# Patient Record
Sex: MALE | Race: BLACK OR AFRICAN AMERICAN | NOT HISPANIC OR LATINO
[De-identification: names, ages, dates, MRNs, and addresses within clinical notes are randomized per-mention and may not be internally consistent; named-entity substitution may affect disease eponyms.]

---

## 2018-06-12 ENCOUNTER — APPOINTMENT (OUTPATIENT)
Dept: INTERNAL MEDICINE | Facility: CLINIC | Age: 48
End: 2018-06-12
Payer: COMMERCIAL

## 2018-06-12 VITALS
TEMPERATURE: 98.7 F | WEIGHT: 208 LBS | HEART RATE: 80 BPM | OXYGEN SATURATION: 98 % | BODY MASS INDEX: 29.12 KG/M2 | DIASTOLIC BLOOD PRESSURE: 72 MMHG | HEIGHT: 71 IN | SYSTOLIC BLOOD PRESSURE: 120 MMHG

## 2018-06-12 PROCEDURE — 99213 OFFICE O/P EST LOW 20 MIN: CPT

## 2018-06-12 NOTE — HISTORY OF PRESENT ILLNESS
[FreeTextEntry8] : \par \par CC;tick bite\par \par patient is a 48-year-old male with history of prediabetes, overweight and seasonal allergies who traveling to [facility] when noted a tick bite the following day denies rash, fever, chills, joint pain. she also complains of nail discoloration on big toe

## 2018-06-12 NOTE — PHYSICAL EXAM
[No Acute Distress] : no acute distress [Well Nourished] : well nourished [Well Developed] : well developed [Well-Appearing] : well-appearing [No JVD] : no jugular venous distention [No Lymphadenopathy] : no lymphadenopathy [No Rash] : no rash [No Skin Lesions] : no skin lesions

## 2018-06-12 NOTE — ASSESSMENT
[FreeTextEntry1] : patient is 48-year-old man with history of overweight, prediabetes and seasonal allergies who presents with a tick bite no fever rash one week ago\par \par #1 tick bite\par will do  tic analysis\par No symptoms observe\par \par 2 CPE\par In one month\par

## 2018-06-25 ENCOUNTER — APPOINTMENT (OUTPATIENT)
Dept: INTERNAL MEDICINE | Facility: CLINIC | Age: 48
End: 2018-06-25
Payer: COMMERCIAL

## 2018-06-25 VITALS
OXYGEN SATURATION: 98 % | SYSTOLIC BLOOD PRESSURE: 110 MMHG | BODY MASS INDEX: 29.26 KG/M2 | HEIGHT: 71 IN | DIASTOLIC BLOOD PRESSURE: 64 MMHG | TEMPERATURE: 98.1 F | HEART RATE: 96 BPM | WEIGHT: 209 LBS

## 2018-06-25 DIAGNOSIS — J30.2 OTHER SEASONAL ALLERGIC RHINITIS: ICD-10-CM

## 2018-06-25 DIAGNOSIS — H40.9 UNSPECIFIED GLAUCOMA: ICD-10-CM

## 2018-06-25 LAB
BILIRUB UR QL STRIP: NEGATIVE
CLARITY UR: CLEAR
GLUCOSE UR-MCNC: NEGATIVE
HCG UR QL: 0.2 EU/DL
HGB UR QL STRIP.AUTO: NEGATIVE
INSECT SPEC: ABNORMAL
KETONES UR-MCNC: NEGATIVE
LEUKOCYTE ESTERASE UR QL STRIP: NEGATIVE
NITRITE UR QL STRIP: NEGATIVE
PH UR STRIP: 5.5
PROT UR STRIP-MCNC: NEGATIVE
SP GR UR STRIP: 1.02

## 2018-06-25 PROCEDURE — 36415 COLL VENOUS BLD VENIPUNCTURE: CPT

## 2018-06-25 PROCEDURE — 81003 URINALYSIS AUTO W/O SCOPE: CPT | Mod: QW

## 2018-06-25 PROCEDURE — 99396 PREV VISIT EST AGE 40-64: CPT | Mod: 25

## 2018-06-25 NOTE — PHYSICAL EXAM
[No Acute Distress] : no acute distress [Well Nourished] : well nourished [Well Developed] : well developed [Well-Appearing] : well-appearing [Normal Voice/Communication] : normal voice/communication [Normal Sclera/Conjunctiva] : normal sclera/conjunctiva [PERRL] : pupils equal round and reactive to light [Normal Outer Ear/Nose] : the outer ears and nose were normal in appearance [Normal Oropharynx] : the oropharynx was normal [No Lymphadenopathy] : no lymphadenopathy [Thyroid Normal, No Nodules] : the thyroid was normal and there were no nodules present [No Respiratory Distress] : no respiratory distress  [Clear to Auscultation] : lungs were clear to auscultation bilaterally [No Accessory Muscle Use] : no accessory muscle use [Normal Percussion] : the chest was normal to percussion [Normal Rate] : normal rate  [Regular Rhythm] : with a regular rhythm [Normal S1, S2] : normal S1 and S2 [No Murmur] : no murmur heard [No Carotid Bruits] : no carotid bruits [No Abdominal Bruit] : a ~M bruit was not heard ~T in the abdomen [Pedal Pulses Present] : the pedal pulses are present [No Edema] : there was no peripheral edema [No Extremity Clubbing/Cyanosis] : no extremity clubbing/cyanosis [No Palpable Aorta] : no palpable aorta [Normal Appearance] : normal in appearance [No Nipple Discharge] : no nipple discharge [No Axillary Lymphadenopathy] : no axillary lymphadenopathy [Soft] : abdomen soft [Non Tender] : non-tender [Non-distended] : non-distended [No Masses] : no abdominal mass palpated [No HSM] : no HSM [Normal Bowel Sounds] : normal bowel sounds [No Hernias] : no hernias [No Stool to Guaiac] : no stool to guaiac [Normal Sphincter Tone] : normal sphincter tone [No Mass] : no mass [Penis Abnormality] : normal circumcised penis [Scrotum] : the scrotum was normal [Testes Tenderness] : no tenderness of the testes [Testes Mass (___cm)] : there were no testicular masses [Anus Abnormality] : the anus and perineum were normal [Prostate Tenderness] : the prostate was not tender [Prostate Enlarged] : was enlarged [Normal Supraclavicular Nodes] : no supraclavicular lymphadenopathy [Normal Axillary Nodes] : no axillary lymphadenopathy [Normal Posterior Cervical Nodes] : no posterior cervical lymphadenopathy [Normal Femoral Nodes] : no femoral lymphadenopathy [Normal Anterior Cervical Nodes] : no anterior cervical lymphadenopathy [Normal Inguinal Nodes] : no inguinal lymphadenopathy [No CVA Tenderness] : no CVA  tenderness [No Spinal Tenderness] : no spinal tenderness [No Joint Swelling] : no joint swelling [Grossly Normal Strength/Tone] : grossly normal strength/tone [No Rash] : no rash [Normal Gait] : normal gait [Coordination Grossly Intact] : coordination grossly intact [No Focal Deficits] : no focal deficits [Speech Grossly Normal] : speech grossly normal [Memory Grossly Normal] : memory grossly normal [Normal Affect] : the affect was normal [Alert and Oriented x3] : oriented to person, place, and time [Normal Mood] : the mood was normal [Normal Insight/Judgement] : insight and judgment were intact [Stool Occult Blood] : no occult stool [Prostate Nodule] : did not have a nodule [Prostate Tenderness] : was not tender [Prostate Fluctuant] : was not fluctuant [de-identified] : Bilateral cerumen [de-identified] : left leg varicosity mild [de-identified] : Diastasis recti

## 2018-06-25 NOTE — HISTORY OF PRESENT ILLNESS
[FreeTextEntry1] : Comprehensive annual physical examination [de-identified] : \par \par Patient is a healthy 48-year-old  male with history of glaucoma, seasonal allergies, prediabetes, BPH and vitamin D insufficiency who presents for comprehensive annual physical examination patient had a tick bite several weeks ago identified as [place] patient denies rash, fever, chills, chest pain, palpitation, lightheadedness, dizziness.

## 2018-06-25 NOTE — ASSESSMENT
[FreeTextEntry1] : Patient is a 48-year-old male with history of overweight, prediabetes, allergic rhinitis, vitamin D insufficiency who presents for complaints of annual physical examination\par \par #1 overweight\par Counseled on exercise\par \par 2 prediabetes\par Check hemoglobin A1c\par Counseled on diet\par \par #3 vitamin D deficiency\par Counseled on vitamin D 400 units a day\par \par #4 BPH\par Enlarged prostate will check PSA\par \par 5 history of tick bite\par [place] tick usually doesn't carry Lyme no rash no other symptoms no flulike syndrome.\par \par #6 health maintenance\par Counseled on sunscreen protection\par Check routine labs\par Followup in 6-12 months\par \par #7, history of kidney cancer\par Check point-of-care UA

## 2018-06-25 NOTE — HEALTH RISK ASSESSMENT
[Excellent] : ~his/her~  mood as  excellent [No falls in past year] : Patient reported no falls in the past year [0] : 2) Feeling down, depressed, or hopeless: Not at all (0) [None] : None [With Family] : lives with family [Employed] : employed [College] : College [] :  [Sexually Active] : sexually active [Feels Safe at Home] : Feels safe at home [Fully functional (bathing, dressing, toileting, transferring, walking, feeding)] : Fully functional (bathing, dressing, toileting, transferring, walking, feeding) [Fully functional (using the telephone, shopping, preparing meals, housekeeping, doing laundry, using] : Fully functional and needs no help or supervision to perform IADLs (using the telephone, shopping, preparing meals, housekeeping, doing laundry, using transportation, managing medications and managing finances) [] : No [de-identified] : social [Change in mental status noted] : No change in mental status noted [Language] : denies difficulty with language [Learning/Retaining New Information] : denies difficulty learning/retaining new information [Handling Complex Tasks] : denies difficulty handling complex tasks [Reasoning] : denies difficulty with reasoning [Spatial Ability and Orientation] : denies difficulty with spatial ability and orientation [High Risk Behavior] : no high risk behavior

## 2018-07-19 LAB
25(OH)D3 SERPL-MCNC: 22.3 NG/ML
ALBUMIN SERPL ELPH-MCNC: 4.4 G/DL
ALP BLD-CCNC: 79 U/L
ALT SERPL-CCNC: 24 U/L
ANION GAP SERPL CALC-SCNC: 11 MMOL/L
AST SERPL-CCNC: 28 U/L
BASOPHILS # BLD AUTO: 0.04 K/UL
BASOPHILS NFR BLD AUTO: 0.7 %
BILIRUB SERPL-MCNC: 0.2 MG/DL
BUN SERPL-MCNC: 18 MG/DL
CALCIUM SERPL-MCNC: 9.2 MG/DL
CHLORIDE SERPL-SCNC: 106 MMOL/L
CHOLEST SERPL-MCNC: 195 MG/DL
CHOLEST/HDLC SERPL: 3.5 RATIO
CO2 SERPL-SCNC: 24 MMOL/L
CREAT SERPL-MCNC: 1.13 MG/DL
EOSINOPHIL # BLD AUTO: 0.31 K/UL
EOSINOPHIL NFR BLD AUTO: 5.3 %
GLUCOSE SERPL-MCNC: 113 MG/DL
HBA1C MFR BLD HPLC: 6.2 %
HCT VFR BLD CALC: 44.6 %
HDLC SERPL-MCNC: 56 MG/DL
HGB BLD-MCNC: 13.2 G/DL
IMM GRANULOCYTES NFR BLD AUTO: 0 %
LDLC SERPL CALC-MCNC: 128 MG/DL
LYMPHOCYTES # BLD AUTO: 1.81 K/UL
LYMPHOCYTES NFR BLD AUTO: 31 %
MAN DIFF?: NORMAL
MCHC RBC-ENTMCNC: 26.2 PG
MCHC RBC-ENTMCNC: 29.6 GM/DL
MCV RBC AUTO: 88.5 FL
MONOCYTES # BLD AUTO: 0.36 K/UL
MONOCYTES NFR BLD AUTO: 6.2 %
NEUTROPHILS # BLD AUTO: 3.31 K/UL
NEUTROPHILS NFR BLD AUTO: 56.8 %
PLATELET # BLD AUTO: 285 K/UL
POTASSIUM SERPL-SCNC: 4.3 MMOL/L
PROT SERPL-MCNC: 7.6 G/DL
PSA SERPL-MCNC: 1.06 NG/ML
RBC # BLD: 5.04 M/UL
RBC # FLD: 14 %
SODIUM SERPL-SCNC: 141 MMOL/L
TRIGL SERPL-MCNC: 56 MG/DL
WBC # FLD AUTO: 5.83 K/UL

## 2020-01-02 ENCOUNTER — APPOINTMENT (OUTPATIENT)
Dept: INTERNAL MEDICINE | Facility: CLINIC | Age: 50
End: 2020-01-02

## 2020-07-23 LAB
SARS-COV-2 IGG SERPL IA-ACNC: <0.1 INDEX
SARS-COV-2 IGG SERPL QL IA: NEGATIVE

## 2020-10-01 ENCOUNTER — APPOINTMENT (OUTPATIENT)
Dept: INTERNAL MEDICINE | Facility: CLINIC | Age: 50
End: 2020-10-01
Payer: COMMERCIAL

## 2020-10-01 VITALS
TEMPERATURE: 97.8 F | OXYGEN SATURATION: 98 % | BODY MASS INDEX: 30.06 KG/M2 | DIASTOLIC BLOOD PRESSURE: 81 MMHG | WEIGHT: 210 LBS | HEART RATE: 72 BPM | SYSTOLIC BLOOD PRESSURE: 131 MMHG | HEIGHT: 70.25 IN

## 2020-10-01 VITALS — SYSTOLIC BLOOD PRESSURE: 122 MMHG | DIASTOLIC BLOOD PRESSURE: 82 MMHG

## 2020-10-01 DIAGNOSIS — Z23 ENCOUNTER FOR IMMUNIZATION: ICD-10-CM

## 2020-10-01 DIAGNOSIS — Z00.00 ENCOUNTER FOR GENERAL ADULT MEDICAL EXAMINATION W/OUT ABNORMAL FINDINGS: ICD-10-CM
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
## 2020-10-01 PROCEDURE — 36415 COLL VENOUS BLD VENIPUNCTURE: CPT

## 2020-10-01 PROCEDURE — 99396 PREV VISIT EST AGE 40-64: CPT | Mod: 25

## 2020-10-01 PROCEDURE — 90686 IIV4 VACC NO PRSV 0.5 ML IM: CPT

## 2020-10-01 PROCEDURE — G0008: CPT

## 2020-10-01 NOTE — HISTORY OF PRESENT ILLNESS
[FreeTextEntry1] : Comprehensive annual physical examination [de-identified] : The patient is a 50-year-old male with history of glaucoma, BPH, overweight, prediabetes and seasonal allergies who presents for comprehensive annual physical examination. Patient feels well denies chest pain, shortness of breath distant exertion palpitation dysuria frequency of urination denies flank pain, nausea vomiting, constipation. Feels well complains of some left sided neck pain

## 2020-10-01 NOTE — ASSESSMENT
[FreeTextEntry1] : Patient is a 50-year-old male with history of overweight, prediabetes, glaucoma, BPH, who presents for comprehensive annual physical examination and complains of neck pain\par \par 1. Neck pain\par most likely musculoskeletal stretching Advil as needed\par observe uses computer lot\par \par 2. Overweight\par counseled on diet and exercise goal weight under 200\par \par 3.. Prediabetes\par  on diet\par hemoglobin A1c\par \par 4. BPH\par check PSA\par minimal symptomatic\par \par 5. Health maintenance\par influenza vaccine today\par referral for colonoscopy\par check routine labs\par follow-up in six months\par \par 6. Vitamin D insufficiency\par counseled on vitamin D usage check level

## 2020-10-01 NOTE — PHYSICAL EXAM
[Normal Sclera/Conjunctiva] : normal sclera/conjunctiva [Normal Outer Ear/Nose] : the outer ears and nose were normal in appearance [No Masses] : no palpable masses [No Nipple Discharge] : no nipple discharge [No Axillary Lymphadenopathy] : no axillary lymphadenopathy [Normal Sphincter Tone] : normal sphincter tone [No Mass] : no mass [Normal] : affect was normal and insight and judgment were intact [Stool Occult Blood] : stool negative for occult blood [de-identified] : Die stasis rec. [de-identified] : Neck no and lymph nodes no tenderness

## 2020-10-01 NOTE — HEALTH RISK ASSESSMENT
[Very Good] : ~his/her~  mood as very good [Yes] : Yes [2 - 4 times a month (2 pts)] : 2-4 times a month (2 points) [1 or 2 (0 pts)] : 1 or 2 (0 points) [Never (0 pts)] : Never (0 points) [No falls in past year] : Patient reported no falls in the past year [0] : 2) Feeling down, depressed, or hopeless: Not at all (0) [HIV test declined] : HIV test declined [Hepatitis C test declined] : Hepatitis C test declined [None] : None [With Family] : lives with family [# of Members in Household ___] :  household currently consist of [unfilled] member(s) [Employed] : employed [College] : College [] :  [Feels Safe at Home] : Feels safe at home [Fully functional (bathing, dressing, toileting, transferring, walking, feeding)] : Fully functional (bathing, dressing, toileting, transferring, walking, feeding) [Fully functional (using the telephone, shopping, preparing meals, housekeeping, doing laundry, using] : Fully functional and needs no help or supervision to perform IADLs (using the telephone, shopping, preparing meals, housekeeping, doing laundry, using transportation, managing medications and managing finances) [Reports normal functional visual acuity (ie: able to read med bottle)] : Reports normal functional visual acuity [Smoke Detector] : smoke detector [Carbon Monoxide Detector] : carbon monoxide detector [Safety elements used in home] : safety elements used in home [Seat Belt] :  uses seat belt [Sunscreen] : uses sunscreen [Audit-CScore] : 2 [de-identified] : Minimal walking [de-identified] : Fairly healthy but widespread [[id number]] : 0 [Change in mental status noted] : No change in mental status noted [Language] : denies difficulty with language [Behavior] : denies difficulty with behavior [Handling Complex Tasks] : denies difficulty handling complex tasks [Reasoning] : denies difficulty with reasoning [Spatial Ability and Orientation] : denies difficulty with spatial ability and orientation [Reports changes in hearing] : Reports no changes in hearing [Reports changes in vision] : Reports no changes in vision [Reports changes in dental health] : Reports no changes in dental health [Guns at Home] : no guns at home [Travel to Developing Areas] : does not  travel to developing areas [TB Exposure] : is not being exposed to tuberculosis [Caregiver Concerns] : does not have caregiver concerns

## 2020-10-07 ENCOUNTER — TRANSCRIPTION ENCOUNTER (OUTPATIENT)
Age: 50
End: 2020-10-07

## 2020-10-09 LAB
25(OH)D3 SERPL-MCNC: 24.3 NG/ML
ALBUMIN SERPL ELPH-MCNC: 4.6 G/DL
ALP BLD-CCNC: 86 U/L
ALT SERPL-CCNC: 19 U/L
ANION GAP SERPL CALC-SCNC: 11 MMOL/L
AST SERPL-CCNC: 19 U/L
BASOPHILS # BLD AUTO: 0.07 K/UL
BASOPHILS NFR BLD AUTO: 0.9 %
BILIRUB SERPL-MCNC: 0.4 MG/DL
BUN SERPL-MCNC: 15 MG/DL
CALCIUM SERPL-MCNC: 9.3 MG/DL
CHLORIDE SERPL-SCNC: 104 MMOL/L
CHOLEST SERPL-MCNC: 225 MG/DL
CHOLEST/HDLC SERPL: 4.3 RATIO
CO2 SERPL-SCNC: 25 MMOL/L
CREAT SERPL-MCNC: 1.1 MG/DL
EOSINOPHIL # BLD AUTO: 0.34 K/UL
EOSINOPHIL NFR BLD AUTO: 4.6 %
ESTIMATED AVERAGE GLUCOSE: 131 MG/DL
GLUCOSE SERPL-MCNC: 83 MG/DL
HBA1C MFR BLD HPLC: 6.2 %
HCT VFR BLD CALC: 43.5 %
HDLC SERPL-MCNC: 53 MG/DL
HGB BLD-MCNC: 13 G/DL
IMM GRANULOCYTES NFR BLD AUTO: 0.3 %
LDLC SERPL CALC-MCNC: 160 MG/DL
LYMPHOCYTES # BLD AUTO: 2.37 K/UL
LYMPHOCYTES NFR BLD AUTO: 31.7 %
MAN DIFF?: NORMAL
MCHC RBC-ENTMCNC: 27.1 PG
MCHC RBC-ENTMCNC: 29.9 GM/DL
MCV RBC AUTO: 90.8 FL
MONOCYTES # BLD AUTO: 0.71 K/UL
MONOCYTES NFR BLD AUTO: 9.5 %
NEUTROPHILS # BLD AUTO: 3.96 K/UL
NEUTROPHILS NFR BLD AUTO: 53 %
PLATELET # BLD AUTO: 288 K/UL
POTASSIUM SERPL-SCNC: 4.3 MMOL/L
PROT SERPL-MCNC: 7.2 G/DL
RBC # BLD: 4.79 M/UL
RBC # FLD: 13.5 %
SODIUM SERPL-SCNC: 140 MMOL/L
TRIGL SERPL-MCNC: 63 MG/DL
WBC # FLD AUTO: 7.47 K/UL

## 2021-01-26 ENCOUNTER — NON-APPOINTMENT (OUTPATIENT)
Age: 51
End: 2021-01-26

## 2021-01-26 ENCOUNTER — APPOINTMENT (OUTPATIENT)
Dept: INTERNAL MEDICINE | Facility: CLINIC | Age: 51
End: 2021-01-26
Payer: COMMERCIAL

## 2021-01-26 VITALS — RESPIRATION RATE: 15 BRPM | OXYGEN SATURATION: 96 % | TEMPERATURE: 98.4 F

## 2021-01-26 PROCEDURE — 99072 ADDL SUPL MATRL&STAF TM PHE: CPT

## 2021-01-26 PROCEDURE — 99211 OFF/OP EST MAY X REQ PHY/QHP: CPT

## 2021-01-28 ENCOUNTER — TRANSCRIPTION ENCOUNTER (OUTPATIENT)
Age: 51
End: 2021-01-28

## 2021-01-28 LAB — SARS-COV-2 N GENE NPH QL NAA+PROBE: DETECTED

## 2021-01-30 ENCOUNTER — APPOINTMENT (OUTPATIENT)
Dept: DISASTER EMERGENCY | Facility: HOSPITAL | Age: 51
End: 2021-01-30

## 2021-01-31 ENCOUNTER — NON-APPOINTMENT (OUTPATIENT)
Age: 51
End: 2021-01-31

## 2021-01-31 ENCOUNTER — OUTPATIENT (OUTPATIENT)
Dept: INPATIENT UNIT | Facility: HOSPITAL | Age: 51
LOS: 1 days | End: 2021-01-31

## 2021-01-31 ENCOUNTER — APPOINTMENT (OUTPATIENT)
Dept: DISASTER EMERGENCY | Facility: HOSPITAL | Age: 51
End: 2021-01-31

## 2021-01-31 ENCOUNTER — INPATIENT (INPATIENT)
Facility: HOSPITAL | Age: 51
LOS: 0 days | Discharge: ANOTHER HOSPITAL | DRG: 177 | End: 2021-02-01
Attending: INTERNAL MEDICINE | Admitting: INTERNAL MEDICINE
Payer: COMMERCIAL

## 2021-01-31 VITALS
TEMPERATURE: 99 F | RESPIRATION RATE: 20 BRPM | SYSTOLIC BLOOD PRESSURE: 142 MMHG | HEART RATE: 79 BPM | HEIGHT: 70 IN | DIASTOLIC BLOOD PRESSURE: 86 MMHG | WEIGHT: 205.03 LBS | OXYGEN SATURATION: 91 %

## 2021-01-31 VITALS
OXYGEN SATURATION: 93 % | SYSTOLIC BLOOD PRESSURE: 101 MMHG | DIASTOLIC BLOOD PRESSURE: 63 MMHG | HEIGHT: 70 IN | WEIGHT: 205.03 LBS | RESPIRATION RATE: 20 BRPM | TEMPERATURE: 99 F | HEART RATE: 76 BPM

## 2021-01-31 DIAGNOSIS — R14.0 ABDOMINAL DISTENSION (GASEOUS): ICD-10-CM

## 2021-01-31 DIAGNOSIS — U07.1 COVID-19: ICD-10-CM

## 2021-01-31 LAB
ALBUMIN SERPL ELPH-MCNC: 3.7 G/DL — SIGNIFICANT CHANGE UP (ref 3.3–5)
ALP SERPL-CCNC: 64 U/L — SIGNIFICANT CHANGE UP (ref 40–120)
ALT FLD-CCNC: 40 U/L — SIGNIFICANT CHANGE UP (ref 10–45)
ANION GAP SERPL CALC-SCNC: 15 MMOL/L — SIGNIFICANT CHANGE UP (ref 5–17)
AST SERPL-CCNC: 70 U/L — HIGH (ref 10–40)
BASOPHILS # BLD AUTO: 0.02 K/UL — SIGNIFICANT CHANGE UP (ref 0–0.2)
BASOPHILS NFR BLD AUTO: 0.3 % — SIGNIFICANT CHANGE UP (ref 0–2)
BILIRUB SERPL-MCNC: 0.6 MG/DL — SIGNIFICANT CHANGE UP (ref 0.2–1.2)
BUN SERPL-MCNC: 13 MG/DL — SIGNIFICANT CHANGE UP (ref 7–23)
CALCIUM SERPL-MCNC: 8.8 MG/DL — SIGNIFICANT CHANGE UP (ref 8.4–10.5)
CHLORIDE SERPL-SCNC: 98 MMOL/L — SIGNIFICANT CHANGE UP (ref 96–108)
CO2 SERPL-SCNC: 24 MMOL/L — SIGNIFICANT CHANGE UP (ref 22–31)
CREAT SERPL-MCNC: 1.02 MG/DL — SIGNIFICANT CHANGE UP (ref 0.5–1.3)
CRP SERPL-MCNC: 11.66 MG/DL — HIGH (ref 0–0.4)
D DIMER BLD IA.RAPID-MCNC: 538 NG/ML DDU — HIGH
EOSINOPHIL # BLD AUTO: 0.02 K/UL — SIGNIFICANT CHANGE UP (ref 0–0.5)
EOSINOPHIL NFR BLD AUTO: 0.3 % — SIGNIFICANT CHANGE UP (ref 0–6)
FERRITIN SERPL-MCNC: 314 NG/ML — SIGNIFICANT CHANGE UP (ref 30–400)
GAS PNL BLDV: SIGNIFICANT CHANGE UP
GLUCOSE SERPL-MCNC: 113 MG/DL — HIGH (ref 70–99)
HCT VFR BLD CALC: 42 % — SIGNIFICANT CHANGE UP (ref 39–50)
HGB BLD-MCNC: 13.2 G/DL — SIGNIFICANT CHANGE UP (ref 13–17)
IMM GRANULOCYTES NFR BLD AUTO: 0.6 % — SIGNIFICANT CHANGE UP (ref 0–1.5)
LYMPHOCYTES # BLD AUTO: 1.05 K/UL — SIGNIFICANT CHANGE UP (ref 1–3.3)
LYMPHOCYTES # BLD AUTO: 13.5 % — SIGNIFICANT CHANGE UP (ref 13–44)
MAGNESIUM SERPL-MCNC: 2.5 MG/DL — SIGNIFICANT CHANGE UP (ref 1.6–2.6)
MCHC RBC-ENTMCNC: 26.1 PG — LOW (ref 27–34)
MCHC RBC-ENTMCNC: 31.4 GM/DL — LOW (ref 32–36)
MCV RBC AUTO: 83 FL — SIGNIFICANT CHANGE UP (ref 80–100)
MONOCYTES # BLD AUTO: 0.53 K/UL — SIGNIFICANT CHANGE UP (ref 0–0.9)
MONOCYTES NFR BLD AUTO: 6.8 % — SIGNIFICANT CHANGE UP (ref 2–14)
NEUTROPHILS # BLD AUTO: 6.13 K/UL — SIGNIFICANT CHANGE UP (ref 1.8–7.4)
NEUTROPHILS NFR BLD AUTO: 78.5 % — HIGH (ref 43–77)
NRBC # BLD: 0 /100 WBCS — SIGNIFICANT CHANGE UP (ref 0–0)
PHOSPHATE SERPL-MCNC: 2.2 MG/DL — LOW (ref 2.5–4.5)
PLATELET # BLD AUTO: 250 K/UL — SIGNIFICANT CHANGE UP (ref 150–400)
POTASSIUM SERPL-MCNC: 3.5 MMOL/L — SIGNIFICANT CHANGE UP (ref 3.5–5.3)
POTASSIUM SERPL-SCNC: 3.5 MMOL/L — SIGNIFICANT CHANGE UP (ref 3.5–5.3)
PROCALCITONIN SERPL-MCNC: 0.12 NG/ML — HIGH (ref 0.02–0.1)
PROT SERPL-MCNC: 7.6 G/DL — SIGNIFICANT CHANGE UP (ref 6–8.3)
RBC # BLD: 5.06 M/UL — SIGNIFICANT CHANGE UP (ref 4.2–5.8)
RBC # FLD: 13.1 % — SIGNIFICANT CHANGE UP (ref 10.3–14.5)
SARS-COV-2 RNA SPEC QL NAA+PROBE: DETECTED
SODIUM SERPL-SCNC: 137 MMOL/L — SIGNIFICANT CHANGE UP (ref 135–145)
WBC # BLD: 7.8 K/UL — SIGNIFICANT CHANGE UP (ref 3.8–10.5)
WBC # FLD AUTO: 7.8 K/UL — SIGNIFICANT CHANGE UP (ref 3.8–10.5)

## 2021-01-31 PROCEDURE — 99285 EMERGENCY DEPT VISIT HI MDM: CPT | Mod: 25

## 2021-01-31 PROCEDURE — 82947 ASSAY GLUCOSE BLOOD QUANT: CPT

## 2021-01-31 PROCEDURE — 71045 X-RAY EXAM CHEST 1 VIEW: CPT | Mod: 26

## 2021-01-31 PROCEDURE — 85025 COMPLETE CBC W/AUTO DIFF WBC: CPT

## 2021-01-31 PROCEDURE — 93010 ELECTROCARDIOGRAM REPORT: CPT | Mod: NC

## 2021-01-31 PROCEDURE — 86140 C-REACTIVE PROTEIN: CPT

## 2021-01-31 PROCEDURE — U0003: CPT

## 2021-01-31 PROCEDURE — 83735 ASSAY OF MAGNESIUM: CPT

## 2021-01-31 PROCEDURE — 80053 COMPREHEN METABOLIC PANEL: CPT

## 2021-01-31 PROCEDURE — 82728 ASSAY OF FERRITIN: CPT

## 2021-01-31 PROCEDURE — 84145 PROCALCITONIN (PCT): CPT

## 2021-01-31 PROCEDURE — 84295 ASSAY OF SERUM SODIUM: CPT

## 2021-01-31 PROCEDURE — 82803 BLOOD GASES ANY COMBINATION: CPT

## 2021-01-31 PROCEDURE — 99285 EMERGENCY DEPT VISIT HI MDM: CPT

## 2021-01-31 PROCEDURE — 96374 THER/PROPH/DIAG INJ IV PUSH: CPT

## 2021-01-31 PROCEDURE — 82435 ASSAY OF BLOOD CHLORIDE: CPT

## 2021-01-31 PROCEDURE — 82330 ASSAY OF CALCIUM: CPT

## 2021-01-31 PROCEDURE — 93005 ELECTROCARDIOGRAM TRACING: CPT

## 2021-01-31 PROCEDURE — 71250 CT THORAX DX C-: CPT | Mod: 26

## 2021-01-31 PROCEDURE — 84132 ASSAY OF SERUM POTASSIUM: CPT

## 2021-01-31 PROCEDURE — 71250 CT THORAX DX C-: CPT

## 2021-01-31 PROCEDURE — U0005: CPT

## 2021-01-31 PROCEDURE — 85018 HEMOGLOBIN: CPT

## 2021-01-31 PROCEDURE — 71045 X-RAY EXAM CHEST 1 VIEW: CPT

## 2021-01-31 PROCEDURE — 84100 ASSAY OF PHOSPHORUS: CPT

## 2021-01-31 PROCEDURE — 83605 ASSAY OF LACTIC ACID: CPT

## 2021-01-31 PROCEDURE — 85014 HEMATOCRIT: CPT

## 2021-01-31 PROCEDURE — 85379 FIBRIN DEGRADATION QUANT: CPT

## 2021-01-31 RX ORDER — ACETAMINOPHEN 500 MG
650 TABLET ORAL ONCE
Refills: 0 | Status: COMPLETED | OUTPATIENT
Start: 2021-01-31 | End: 2021-01-31

## 2021-01-31 RX ORDER — SODIUM CHLORIDE 9 MG/ML
1000 INJECTION, SOLUTION INTRAVENOUS ONCE
Refills: 0 | Status: COMPLETED | OUTPATIENT
Start: 2021-01-31 | End: 2021-01-31

## 2021-01-31 RX ORDER — REMDESIVIR 5 MG/ML
200 INJECTION INTRAVENOUS EVERY 24 HOURS
Refills: 0 | Status: DISCONTINUED | OUTPATIENT
Start: 2021-01-31 | End: 2021-02-01

## 2021-01-31 RX ORDER — ENOXAPARIN SODIUM 100 MG/ML
40 INJECTION SUBCUTANEOUS DAILY
Refills: 0 | Status: DISCONTINUED | OUTPATIENT
Start: 2021-01-31 | End: 2021-02-01

## 2021-01-31 RX ORDER — REMDESIVIR 5 MG/ML
INJECTION INTRAVENOUS
Refills: 0 | Status: DISCONTINUED | OUTPATIENT
Start: 2021-01-31 | End: 2021-02-01

## 2021-01-31 RX ORDER — DEXAMETHASONE 0.5 MG/5ML
6 ELIXIR ORAL ONCE
Refills: 0 | Status: COMPLETED | OUTPATIENT
Start: 2021-01-31 | End: 2021-01-31

## 2021-01-31 RX ORDER — DEXAMETHASONE 0.5 MG/5ML
6 ELIXIR ORAL DAILY
Refills: 0 | Status: DISCONTINUED | OUTPATIENT
Start: 2021-01-31 | End: 2021-02-01

## 2021-01-31 RX ADMIN — Medication 6 MILLIGRAM(S): at 13:37

## 2021-01-31 RX ADMIN — SODIUM CHLORIDE 1000 MILLILITER(S): 9 INJECTION, SOLUTION INTRAVENOUS at 13:37

## 2021-01-31 RX ADMIN — Medication 650 MILLIGRAM(S): at 13:37

## 2021-01-31 NOTE — H&P ADULT - ASSESSMENT
49 y/o M w/ no sig PMH presenting w/ shortness of breath. Pt reports 10 days ago developing fevers. A few days later had covid swab and tested positive for COVID. Since then he has been experiencing persistent fevers, cough, shortness of breath, malaise, diarrhea, poor appetite, and dyspnea on exertion. Diarrhea multiple times a day, non bloody. Went to [facility] infusion center today but was told he did not qualify for infusions so came to ED for further evaluation. Last took advil earlier this morning. Denies headache, dizziness, blurred vision, chest pain, abdominal pain, nausea, vomiting, constipation, urinary symptoms, rash. 51 y/o M w/ no sig PMH presenting w/ shortness of breath. Pt reports 10 days ago developing fevers. A few days later had covid swab and tested positive for COVID. Since then he has been experiencing persistent fevers, cough, shortness of breath, malaise, diarrhea, poor appetite, and dyspnea on exertion. Diarrhea multiple times a day, non bloody. Went to [facility] infusion center today but was told he did not qualify for infusions so came to ED for further evaluation. Last took advil earlier this morning. Denies headache, dizziness, blurred vision, chest pain, abdominal pain, nausea, vomiting, constipation, urinary symptoms, rash.    A/P: acute covid illness complicated by covid PNA: start remdesivir, dexamethasone, supplement w O2, duonebs prn, pulm and ID cnsults called. DVT ppx w sc lovenox 40 mg q12H. prn tylenol. supportive treatment for diarrhea/nausea. IVF

## 2021-01-31 NOTE — ED ADULT NURSE REASSESSMENT NOTE - NS ED NURSE REASSESS COMMENT FT1
Pt remains AAOx4, NAD, resp nonlabored on 3 L NC, resting comfortably in bed with call bell at bedside. Pt denies headache, dizziness, chest pain, palpitations, SOB, abd pain, n/v/d, urinary symptoms, fevers, weakness at this time. Pt awaiting transfer to [facility]. Safety maintained.

## 2021-01-31 NOTE — H&P ADULT - NSHPPHYSICALEXAM_GEN_ALL_CORE
T(F): 98 (01-31-21 @ 15:04), Max: 99.3 (01-31-21 @ 12:44)  HR: 80 (01-31-21 @ 15:04) (76 - 80)  BP: 135/78 (01-31-21 @ 15:04) (101/63 - 142/86)  RR: 20 (01-31-21 @ 15:04) (20 - 20)  SpO2: 99% (01-31-21 @ 15:04) (91% - 99%)    PHYSICAL EXAM:  GENERAL: NAD, well-developed  HEAD:  Atraumatic, Normocephalic  EYES: EOMI, PERRLA, conjunctiva and sclera clear  NECK: Supple, No JVD  CHEST/LUNG: Clear to auscultation bilaterally; No wheeze  HEART: Regular rate and rhythm; No murmurs, rubs, or gallops  ABDOMEN: Soft, Nontender, Nondistended; Bowel sounds present  EXTREMITIES:  2+ Peripheral Pulses, No clubbing, cyanosis, or edema  PSYCH: AAOx3  NEUROLOGY: non-focal  SKIN: No rashes or lesions

## 2021-01-31 NOTE — H&P ADULT - HISTORY OF PRESENT ILLNESS
49 y/o M w/ no sig PMH presenting w/ shortness of breath. Pt reports 10 days ago developing fevers. A few days later had covid swab and tested positive for COVID. Since then he has been experiencing persistent fevers, cough, shortness of breath, malaise, diarrhea, poor appetite, and dyspnea on exertion. Diarrhea multiple times a day, non bloody. Went to [facility] infusion center today but was told he did not qualify for infusions so came to ED for further evaluation. Last took advil earlier this morning. Denies headache, dizziness, blurred vision, chest pain, abdominal pain, nausea, vomiting, constipation, urinary symptoms, rash.

## 2021-01-31 NOTE — ED PROVIDER NOTE - CLINICAL SUMMARY MEDICAL DECISION MAKING FREE TEXT BOX
51 y/o M w/ no sig PMH presenting w/ SOB. Pt w/ symptoms consistent of worsening COVID infection. Pt hypoxic on room air and requiring oxygen now. Pt will require admission. Plan for labs, imaging, steroids, tylenol, IVF, supplemental oxygen, EKG, pulse ox monitoring. Will reassess the need for additional interventions as clinically warranted.

## 2021-01-31 NOTE — ED PROVIDER NOTE - PROGRESS NOTE DETAILS
Dr. Petey Barton, PGY-3: pt accepted for transfer to [facility] under Dr. Morley. Pt aware.

## 2021-01-31 NOTE — ED PROVIDER NOTE - OBJECTIVE STATEMENT
51 y/o M w/ no sig PMH presenting w/ shortness of breath. Gold room 11. Pt reports 10 days ago developing fevers. A few days later had covid swab and tested positive for COVID. Since then he has been experiencing persistent fevers, cough, shortness of breath, malaise, diarrhea, poor appetite, and dyspnea on exertion. Diarrhea multiple times a day, non bloody. Went to [facility] infusion center today but was told he did not qualify for infusions so came to ED for further evaluation. Last took advil earlier this morning. Denies headache, dizziness, blurred vision, chest pain, abdominal pain, nausea, vomiting, constipation, urinary symptoms, MSK pain, rash.

## 2021-01-31 NOTE — ED PROVIDER NOTE - PHYSICAL EXAMINATION
Gen: NAD, AOx3, able to make needs known, non-toxic  Head: NCAT  HEENT: EOMI, oral mucosa moist, normal conjunctiva  Lung: mildly tachypneic, hypoxic to 91% on room air improves to 99 on 3L NC, crackles at the bases b/l  CV: RRR, no murmurs  Abd: soft, NTND, no guarding  MSK: no visible deformities  Neuro: Appears non focal  Skin: Warm, well perfused  Psych: normal affect

## 2021-01-31 NOTE — ED ADULT NURSE REASSESSMENT NOTE - NS ED NURSE REASSESS COMMENT FT1
Report received from DOUG Burt. Pt AAOx4, NAD, resp nonlabored, skin warm/dry, resting comfortably in bed with call bell at bedside. Pt denies headache, dizziness, chest pain, palpitations, abd pain, n/v/d, urinary symptoms, fevers, weakness at this time. Pt awaiting bed. Safety maintained. Report received from DOUG Burt. Pt AAOx4, NAD, resp nonlabored on 3L NC, skin warm/dry, resting comfortably in bed with call bell at bedside. Pt denies headache, dizziness, chest pain, palpitations, abd pain, n/v/d, urinary symptoms, fevers, weakness at this time. Pt awaiting transfer. No initial RN note noted. Safety maintained.

## 2021-01-31 NOTE — ED PROVIDER NOTE - CARE PLAN
Principal Discharge DX:	COVID-19  Secondary Diagnosis:	Hypoxia  Secondary Diagnosis:	Fever  Secondary Diagnosis:	Shortness of breath

## 2021-01-31 NOTE — ED ADULT TRIAGE NOTE - ARRIVAL INFO ADDITIONAL COMMENTS
pt states sent from [facility] monoclonal antibody appt was told did not qualify for that treatment

## 2021-01-31 NOTE — ED PROVIDER NOTE - CROS ED ROS STATEMENT
.  Lungs:+ Labored on RA. Decreased BS at L base. Wheezes noted. Abd: soft, bowel sounds + , No organomegaly   Ext: Leg edema + , no cyanosis  Back Pre-sacral edema  Skin: Warm.   No rash  Neuro: nonfocal.  Joints: No erythema noted over joints., Scar Rt shoulder, Rt knee , Lower spine  PICC left upper arm     DATA:    CBC with Differential:    Lab Results   Component Value Date    WBC 2.5 02/15/2020    RBC 3.12 02/15/2020    RBC 5.28 02/15/2017    HGB 9.1 02/15/2020    HCT 27.2 02/15/2020    PLT 42 02/15/2020    MCV 87.1 02/15/2020    MCH 29.2 02/15/2020    MCHC 33.5 02/15/2020    RDW 17.1 02/15/2020    NRBC 1 02/06/2020    NRBC 1 02/06/2020    BANDSPCT 5 02/11/2020    BLASTSPCT 2 02/13/2020    LYMPHOPCT 3.0 02/15/2020    LYMPHOPCT 23.3 02/15/2017    MONOPCT 3.0 02/15/2020    MYELOPCT 1 01/30/2019    BASOPCT 0.0 02/15/2020    MONOSABS 0.1 02/15/2020    LYMPHSABS 0.1 02/15/2020    EOSABS 0.0 02/15/2020    BASOSABS 0.0 02/15/2020     CMP:    Lab Results   Component Value Date     02/15/2020    K 5.0 02/15/2020    K 4.5 02/06/2020     02/15/2020    CO2 20 02/15/2020    BUN 65 02/15/2020    CREATININE 2.1 02/15/2020    GFRAA 38 02/15/2020    GFRAA >60 02/22/2013    AGRATIO 2.0 02/06/2020    LABGLOM 31 02/15/2020    GLUCOSE 116 02/15/2020    PROT 4.5 02/14/2020    PROT 6.4 02/22/2013    LABALBU 2.9 02/14/2020    CALCIUM 8.4 02/15/2020    BILITOT 1.3 02/14/2020    ALKPHOS 78 02/14/2020    AST 23 02/14/2020    ALT 15 02/14/2020     Magnesium:    Lab Results   Component Value Date    MG 2.30 02/14/2020     Phosphorus:    Lab Results   Component Value Date    PHOS 4.8 02/15/2020     Uric Acid:    Lab Results   Component Value Date    LABURIC 5.1 02/15/2020     Last 3 Troponin:    Lab Results   Component Value Date    TROPONINI <0.01 02/06/2020    TROPONINI <0.01 11/19/2013    TROPONINI <0.01 11/18/2013     U/A:    Lab Results   Component Value Date    COLORU Yellow 02/09/2020    PROTEINU TRACE 02/09/2020    PHUR 5.5 02/09/2020    WBCUA 0-2 02/09/2020    RBCUA 0-2 02/09/2020    MUCUS Rare 02/09/2020    BACTERIA 1+ 01/07/2019    CLARITYU Clear 02/09/2020    SPECGRAV 1.025 02/09/2020    LEUKOCYTESUR Negative 02/09/2020    UROBILINOGEN 0.2 02/09/2020    BILIRUBINUR SMALL 02/09/2020    BLOODU Negative 02/09/2020    GLUCOSEU Negative 02/09/2020    AMORPHOUS 1+ 01/07/2019           IMPRESSION/RECOMMENDATIONS:      1. CKD stage  3; Follows with Dr. Bryanna Mccallum  Baseline Cr 1.8- 2  2. YANCI -Peak 2.6 2/12/20 and 2.1 today   Pre Renal as FeNa < 1   Uric acid dropped from 12.8 to 3.1 s/p elitek (5.1 today)  3. 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 Hyponatremia; Corrected  5. HTN.   - Renal artery duplex scan negative for significant renal artery disease.  - BP  mildly high. - Give add'l IV Lasix. Thank you for allowing me to participate in the care of this patient. I will continue to follow along. Please call with questions.       Signed:  Janay Rothman M.D.   [facility]  Answering Service: [phone]  2/15/2020, 6:03 PM all other ROS negative except as per HPI

## 2021-01-31 NOTE — ED PROVIDER NOTE - ATTENDING CONTRIBUTION TO CARE
Keith Liz MD, FACEP  This patient meets criteria for severe covid-19 illness and requires hospitalization. In accordance with the current institutional initiative for covid hospitalization load balancing, this patient will be transferred to be admitted to one of our sister facilities for ongoing treatment. The patient is stable and appropriate for transfer and was informed.

## 2021-02-01 ENCOUNTER — INPATIENT (INPATIENT)
Facility: HOSPITAL | Age: 51
LOS: 3 days | Discharge: HOME HEALTH | DRG: 177 | End: 2021-02-05
Attending: INTERNAL MEDICINE | Admitting: HOSPITALIST
Payer: COMMERCIAL

## 2021-02-01 ENCOUNTER — TRANSCRIPTION ENCOUNTER (OUTPATIENT)
Age: 51
End: 2021-02-01

## 2021-02-01 VITALS
TEMPERATURE: 98 F | OXYGEN SATURATION: 100 % | SYSTOLIC BLOOD PRESSURE: 132 MMHG | RESPIRATION RATE: 20 BRPM | HEART RATE: 68 BPM | DIASTOLIC BLOOD PRESSURE: 72 MMHG

## 2021-02-01 VITALS
DIASTOLIC BLOOD PRESSURE: 76 MMHG | OXYGEN SATURATION: 94 % | HEART RATE: 82 BPM | SYSTOLIC BLOOD PRESSURE: 124 MMHG | RESPIRATION RATE: 19 BRPM | TEMPERATURE: 99 F

## 2021-02-01 DIAGNOSIS — D64.9 ANEMIA, UNSPECIFIED: ICD-10-CM

## 2021-02-01 DIAGNOSIS — R63.8 OTHER SYMPTOMS AND SIGNS CONCERNING FOOD AND FLUID INTAKE: ICD-10-CM

## 2021-02-01 DIAGNOSIS — U07.1 COVID-19: ICD-10-CM

## 2021-02-01 DIAGNOSIS — J96.00 ACUTE RESPIRATORY FAILURE, UNSPECIFIED WHETHER WITH HYPOXIA OR HYPERCAPNIA: ICD-10-CM

## 2021-02-01 DIAGNOSIS — J96.01 ACUTE RESPIRATORY FAILURE WITH HYPOXIA: ICD-10-CM

## 2021-02-01 LAB
ALBUMIN SERPL ELPH-MCNC: 3.2 G/DL — LOW (ref 3.3–5)
ALP SERPL-CCNC: 57 U/L — SIGNIFICANT CHANGE UP (ref 40–120)
ALT FLD-CCNC: 33 U/L — SIGNIFICANT CHANGE UP (ref 10–45)
ANION GAP SERPL CALC-SCNC: 14 MMOL/L — SIGNIFICANT CHANGE UP (ref 5–17)
ANISOCYTOSIS BLD QL: SLIGHT — SIGNIFICANT CHANGE UP
APTT BLD: 30.1 SEC — SIGNIFICANT CHANGE UP (ref 27.5–35.5)
AST SERPL-CCNC: 50 U/L — HIGH (ref 10–40)
BASOPHILS # BLD AUTO: 0 K/UL — SIGNIFICANT CHANGE UP (ref 0–0.2)
BASOPHILS NFR BLD AUTO: 0 % — SIGNIFICANT CHANGE UP (ref 0–2)
BILIRUB SERPL-MCNC: 0.4 MG/DL — SIGNIFICANT CHANGE UP (ref 0.2–1.2)
BUN SERPL-MCNC: 16 MG/DL — SIGNIFICANT CHANGE UP (ref 7–23)
BURR CELLS BLD QL SMEAR: PRESENT — SIGNIFICANT CHANGE UP
CALCIUM SERPL-MCNC: 8.2 MG/DL — LOW (ref 8.4–10.5)
CHLORIDE SERPL-SCNC: 101 MMOL/L — SIGNIFICANT CHANGE UP (ref 96–108)
CO2 SERPL-SCNC: 26 MMOL/L — SIGNIFICANT CHANGE UP (ref 22–31)
CREAT SERPL-MCNC: 0.76 MG/DL — SIGNIFICANT CHANGE UP (ref 0.5–1.3)
CRP SERPL-MCNC: 9.59 MG/DL — HIGH (ref 0–0.4)
D DIMER BLD IA.RAPID-MCNC: 5362 NG/ML DDU — HIGH
EOSINOPHIL # BLD AUTO: 0 K/UL — SIGNIFICANT CHANGE UP (ref 0–0.5)
EOSINOPHIL NFR BLD AUTO: 0 % — SIGNIFICANT CHANGE UP (ref 0–6)
GIANT PLATELETS BLD QL SMEAR: PRESENT — SIGNIFICANT CHANGE UP
GLUCOSE SERPL-MCNC: 201 MG/DL — HIGH (ref 70–99)
HCT VFR BLD CALC: 38.1 % — LOW (ref 39–50)
HGB BLD-MCNC: 11.5 G/DL — LOW (ref 13–17)
HYPOCHROMIA BLD QL: SLIGHT — SIGNIFICANT CHANGE UP
INR BLD: 1.07 — SIGNIFICANT CHANGE UP (ref 0.88–1.16)
LYMPHOCYTES # BLD AUTO: 0.45 K/UL — LOW (ref 1–3.3)
LYMPHOCYTES # BLD AUTO: 8.8 % — LOW (ref 13–44)
MAGNESIUM SERPL-MCNC: 2.3 MG/DL — SIGNIFICANT CHANGE UP (ref 1.6–2.6)
MANUAL SMEAR VERIFICATION: SIGNIFICANT CHANGE UP
MCHC RBC-ENTMCNC: 25.1 PG — LOW (ref 27–34)
MCHC RBC-ENTMCNC: 30.2 GM/DL — LOW (ref 32–36)
MCV RBC AUTO: 83 FL — SIGNIFICANT CHANGE UP (ref 80–100)
MONOCYTES # BLD AUTO: 0.13 K/UL — SIGNIFICANT CHANGE UP (ref 0–0.9)
MONOCYTES NFR BLD AUTO: 2.6 % — SIGNIFICANT CHANGE UP (ref 2–14)
NEUTROPHILS # BLD AUTO: 4.51 K/UL — SIGNIFICANT CHANGE UP (ref 1.8–7.4)
NEUTROPHILS NFR BLD AUTO: 87.7 % — HIGH (ref 43–77)
OVALOCYTES BLD QL SMEAR: SLIGHT — SIGNIFICANT CHANGE UP
PHOSPHATE SERPL-MCNC: 2.4 MG/DL — LOW (ref 2.5–4.5)
PLAT MORPH BLD: NORMAL — SIGNIFICANT CHANGE UP
PLATELET # BLD AUTO: 240 K/UL — SIGNIFICANT CHANGE UP (ref 150–400)
POIKILOCYTOSIS BLD QL AUTO: SLIGHT — SIGNIFICANT CHANGE UP
POTASSIUM SERPL-MCNC: 3.9 MMOL/L — SIGNIFICANT CHANGE UP (ref 3.5–5.3)
POTASSIUM SERPL-SCNC: 3.9 MMOL/L — SIGNIFICANT CHANGE UP (ref 3.5–5.3)
PROT SERPL-MCNC: 6.9 G/DL — SIGNIFICANT CHANGE UP (ref 6–8.3)
PROTHROM AB SERPL-ACNC: 12.8 SEC — SIGNIFICANT CHANGE UP (ref 10.6–13.6)
RBC # BLD: 4.59 M/UL — SIGNIFICANT CHANGE UP (ref 4.2–5.8)
RBC # FLD: 13 % — SIGNIFICANT CHANGE UP (ref 10.3–14.5)
RBC BLD AUTO: ABNORMAL
SODIUM SERPL-SCNC: 141 MMOL/L — SIGNIFICANT CHANGE UP (ref 135–145)
VARIANT LYMPHS # BLD: 0.9 % — SIGNIFICANT CHANGE UP (ref 0–6)
WBC # BLD: 5.14 K/UL — SIGNIFICANT CHANGE UP (ref 3.8–10.5)
WBC # FLD AUTO: 5.14 K/UL — SIGNIFICANT CHANGE UP (ref 3.8–10.5)

## 2021-02-01 PROCEDURE — 99223 1ST HOSP IP/OBS HIGH 75: CPT | Mod: GC

## 2021-02-01 PROCEDURE — 93970 EXTREMITY STUDY: CPT | Mod: 26

## 2021-02-01 RX ORDER — REMDESIVIR 5 MG/ML
200 INJECTION INTRAVENOUS EVERY 24 HOURS
Refills: 0 | Status: DISCONTINUED | OUTPATIENT
Start: 2021-02-01 | End: 2021-02-01

## 2021-02-01 RX ORDER — REMDESIVIR 5 MG/ML
100 INJECTION INTRAVENOUS EVERY 24 HOURS
Refills: 0 | Status: COMPLETED | OUTPATIENT
Start: 2021-02-02 | End: 2021-02-05

## 2021-02-01 RX ORDER — ACETAMINOPHEN 500 MG
650 TABLET ORAL EVERY 6 HOURS
Refills: 0 | Status: DISCONTINUED | OUTPATIENT
Start: 2021-02-01 | End: 2021-02-05

## 2021-02-01 RX ORDER — REMDESIVIR 5 MG/ML
100 INJECTION INTRAVENOUS EVERY 24 HOURS
Refills: 0 | Status: CANCELLED | OUTPATIENT
Start: 2021-02-02 | End: 2021-02-01

## 2021-02-01 RX ORDER — IBUPROFEN 200 MG
2 TABLET ORAL
Qty: 0 | Refills: 0 | DISCHARGE

## 2021-02-01 RX ORDER — REMDESIVIR 5 MG/ML
200 INJECTION INTRAVENOUS EVERY 24 HOURS
Refills: 0 | Status: COMPLETED | OUTPATIENT
Start: 2021-02-01 | End: 2021-02-01

## 2021-02-01 RX ORDER — ENOXAPARIN SODIUM 100 MG/ML
40 INJECTION SUBCUTANEOUS EVERY 24 HOURS
Refills: 0 | Status: DISCONTINUED | OUTPATIENT
Start: 2021-02-01 | End: 2021-02-02

## 2021-02-01 RX ORDER — REMDESIVIR 5 MG/ML
INJECTION INTRAVENOUS
Refills: 0 | Status: COMPLETED | OUTPATIENT
Start: 2021-02-01 | End: 2021-02-05

## 2021-02-01 RX ORDER — DEXAMETHASONE 0.5 MG/5ML
6 ELIXIR ORAL EVERY 24 HOURS
Refills: 0 | Status: DISCONTINUED | OUTPATIENT
Start: 2021-02-01 | End: 2021-02-05

## 2021-02-01 RX ORDER — REMDESIVIR 5 MG/ML
INJECTION INTRAVENOUS
Refills: 0 | Status: DISCONTINUED | OUTPATIENT
Start: 2021-02-01 | End: 2021-02-01

## 2021-02-01 RX ORDER — SODIUM,POTASSIUM PHOSPHATES 278-250MG
1 POWDER IN PACKET (EA) ORAL
Refills: 0 | Status: COMPLETED | OUTPATIENT
Start: 2021-02-01 | End: 2021-02-02

## 2021-02-01 RX ADMIN — ENOXAPARIN SODIUM 40 MILLIGRAM(S): 100 INJECTION SUBCUTANEOUS at 06:25

## 2021-02-01 RX ADMIN — Medication 1 PACKET(S): at 18:10

## 2021-02-01 RX ADMIN — Medication 1 PACKET(S): at 13:25

## 2021-02-01 RX ADMIN — Medication 6 MILLIGRAM(S): at 13:25

## 2021-02-01 RX ADMIN — REMDESIVIR 500 MILLIGRAM(S): 5 INJECTION INTRAVENOUS at 04:39

## 2021-02-01 NOTE — DIETITIAN INITIAL EVALUATION ADULT. - ADD RECOMMEND
1. Continue regular diet, encourage PO intake 2. Obtain current wt and add to EMR 3. Monitor lytes and replete 4. RD to remain available prn

## 2021-02-01 NOTE — DISCHARGE NOTE PROVIDER - CARE PROVIDER_API CALL
Will Casillas)  Internal Medicine  [address] [place] [address]  Phone: [phone]  Fax: [phone]  Established Patient  Follow Up Time: 2 weeks    Tee Higginbotham)  Critical Care Medicine; Internal Medicine; Pulmonary Disease  [address] [place] [address]  Phone: [phone]  Fax: [phone]  Follow Up Time: 1 week   Will Casillas)  Internal Medicine  [address] [place] [address]  Phone: [phone]  Fax: [phone]  Established Patient  Follow Up Time: 2 weeks    Tee Higginbotham)  Critical Care Medicine; Internal Medicine; Pulmonary Disease  [address] [place] [address]  Phone: [phone]  Fax: [phone]  Follow Up Time: 1 week    Belgica Julian)  Critical Care Medicine; Pulmonary Disease  [address] [place] [address]  Phone: [phone]  Fax: [phone]  Follow Up Time: 1 month   Will Casillas)  Internal Medicine  [address] [place] [address]  Phone: [phone]  Fax: [phone]  Established Patient  Follow Up Time: 2 weeks    Tee Higginbotham)  Critical Care Medicine; Internal Medicine; Pulmonary Disease  [address] [place] [address]  Phone: [phone]  Fax: [phone]  Follow Up Time: 1 week    Belgica Julian)  Critical Care Medicine; Pulmonary Disease  [address], [address] [place] [address]  Phone: [phone]  Fax: [phone]  Follow Up Time: 1 month    Gadiel Velasquez)  Vascular Surgery  [address] [place] [address]  Phone: [phone]  Fax: [phone]  Follow Up Time:

## 2021-02-01 NOTE — ED ADULT NURSE REASSESSMENT NOTE - NS ED NURSE REASSESS COMMENT FT1
RN attempts to give report to Divya Correa RN for pt being transferred with no success, placed on hold for 15 minutes, Spoke with  Marnie who informed RN that Divya Correa RN will call back when ready for report (phone number provided).  made aware pt is currently being transported.

## 2021-02-01 NOTE — H&P ADULT - ASSESSMENT
50M no significant PMH presents for evaluation of cough, shortness of breath and pleuritic chest discomfort over the last 10 days admitted for acute hypoxic respiratory failure 2/2 COVID  (91% on RA)

## 2021-02-01 NOTE — H&P ADULT - ATTENDING COMMENTS
Pt seen and examined at bedside on 2/1/2021 at 8 am - agree with history and exam as above. Check dopplers given increased D - dimer

## 2021-02-01 NOTE — DISCHARGE NOTE PROVIDER - PROVIDER TOKENS
PROVIDER:[TOKEN:[[id number]],FOLLOWUP:[2 weeks],ESTABLISHEDPATIENT:[T]],PROVIDER:[TOKEN:[[id number]],FOLLOWUP:[1 week]] PROVIDER:[TOKEN:[[id number]],FOLLOWUP:[2 weeks],ESTABLISHEDPATIENT:[T]],PROVIDER:[TOKEN:[[id number]],FOLLOWUP:[1 week]],PROVIDER:[TOKEN:[[id number]],FOLLOWUP:[1 month]] PROVIDER:[TOKEN:[[id number]],FOLLOWUP:[2 weeks],ESTABLISHEDPATIENT:[T]],PROVIDER:[TOKEN:[[id number]],FOLLOWUP:[1 week]],PROVIDER:[TOKEN:[[id number]],FOLLOWUP:[1 month]],PROVIDER:[TOKEN:[[id number]]]

## 2021-02-01 NOTE — DISCHARGE NOTE PROVIDER - NSDCMRMEDTOKEN_GEN_ALL_CORE_FT
Lumigan 0.01% ophthalmic solution: 1 drop(s) to each affected eye once a day (in the evening)  timolol ophthalmic: 1 drop(s) to each affected eye once a day   Lumigan 0.01% ophthalmic solution: 1 drop(s) to each affected eye once a day (in the evening)  timolol ophthalmic: 1 drop(s) to each affected eye once a day (0.5% concentration)   apixaban 5 mg oral tablet: 2 tab(s) orally every 12 hours  apixaban 5 mg oral tablet: 1 tab(s) orally every 12 hours  dexamethasone 6 mg oral tablet: 1 tab(s) orally once a day   Eliquis 5 mg oral tablet: 2 tab(s) orally 2 times a day   Eliquis 5 mg oral tablet: 1 tab(s) orally 2 times a day   Lumigan 0.01% ophthalmic solution: 1 drop(s) to each affected eye once a day (in the evening)  timolol ophthalmic: 1 drop(s) to each affected eye once a day (0.5% concentration)   dexamethasone 6 mg oral tablet: 1 tab(s) orally once a day   Eliquis 5 mg oral tablet: 2 tab(s) orally 2 times a day   Eliquis 5 mg oral tablet: 1 tab(s) orally 2 times a day   Lumigan 0.01% ophthalmic solution: 1 drop(s) to each affected eye once a day (in the evening)  timolol ophthalmic: 1 drop(s) to each affected eye once a day (0.5% concentration)

## 2021-02-01 NOTE — PROGRESS NOTE ADULT - ASSESSMENT
50M no significant PMH presents for evaluation of cough, shortness of breath and pleuritic chest discomfort over the last 10 days admitted for acute hypoxic respiratory failure 2/2 COVID

## 2021-02-01 NOTE — PROGRESS NOTE ADULT - PROBLEM SELECTOR PLAN 3
F: s/p 1L LR in ED  none  E: Replete PRN K<4, Mg<2  N: Regular  DVT: Lovenox  GI: none  CODE: Full  Dispo: COVID [facility]

## 2021-02-01 NOTE — DISCHARGE NOTE PROVIDER - NSDCCPCAREPLAN_GEN_ALL_CORE_FT
PRINCIPAL DISCHARGE DIAGNOSIS  Diagnosis: Pneumonia due to COVID-19 virus  Assessment and Plan of Treatment: You had signs and symptoms concerning for COVID-19. You tested postive. You were treated with decadron and remdesivir while you were in the hospital. You have been clinically stable and deemed safe for discharge to continue your treatment course and quarantine. While you are at home you should stay in your own room whenever possible and wear a mask as much as possible. Please be sure to self-quarantine at home until 14 days have passed since your first positive COVID test from this admission. You tested positive on _____ so please self-quarantine until _____.   If you have to be in the same room as someone else, you should both wear a mask. If you share a restroom, you should wipe it down with bleach wipes between each use. Please continue to practice social distancing and good hand hygiene. You will also have a pulse oximeter device to help you monitor your oxygen levels and heart rate.  If you do experience worsening shortness of breath at home, start to experience new chest pain or new leg pain, please call your doctor and consider coming back.       PRINCIPAL DISCHARGE DIAGNOSIS  Diagnosis: Pneumonia due to COVID-19 virus  Assessment and Plan of Treatment: You had signs and symptoms concerning for COVID-19. You tested postive. You were treated with decadron and remdesivir while you were in the hospital. You have been clinically stable and deemed safe for discharge to continue your treatment course and quarantine. While you are at home you should stay in your own room whenever possible and wear a mask as much as possible. Please be sure to self-quarantine at home until 14 days have passed since your first positive COVID test from this admission. You tested positive on 1/31 so please self-quarantine until 2/14.  If you have to be in the same room as someone else, you should both wear a mask. If you share a restroom, you should wipe it down with bleach wipes between each use. Please continue to practice social distancing and good hand hygiene. You will also have a pulse oximeter device to help you monitor your oxygen levels and heart rate.  If you do experience worsening shortness of breath at home, start to experience new chest pain or new leg pain, please call your doctor and consider coming back.  Please follow-up with your primary care doctor. Please follow-up with vascular doctor Dr. Ho.  follow-up with lung doctor Dr. Julian in a month.      SECONDARY DISCHARGE DIAGNOSES  Diagnosis: Acute pulmonary embolism  Assessment and Plan of Treatment:      PRINCIPAL DISCHARGE DIAGNOSIS  Diagnosis: Pneumonia due to COVID-19 virus  Assessment and Plan of Treatment: You had signs and symptoms concerning for COVID-19. You tested postive. You were treated with decadron and remdesivir while you were in the hospital. You have been clinically stable and deemed safe for discharge to continue your treatment course and quarantine. While you are at home you should stay in your own room whenever possible and wear a mask as much as possible. Please be sure to self-quarantine at home until 14 days have passed since your first positive COVID test from this admission. You tested positive on 1/31 so please self-quarantine until 2/14.  If you have to be in the same room as someone else, you should both wear a mask. If you share a restroom, you should wipe it down with bleach wipes between each use. Please continue to practice social distancing and good hand hygiene. You will also have a pulse oximeter device to help you monitor your oxygen levels and heart rate.  If you do experience worsening shortness of breath at home, start to experience new chest pain or new leg pain, please call your doctor and consider coming back.  Please continue taking dexamethasone (steroid) for 5 more days (2/6-2/10)- 6 mg once daily by mouth, last day will be 2/10.   Please follow-up with your primary care doctor. Please follow-up with vascular doctor Dr. Mukund Ho. Please follow-up with the lung doctor Dr. Julian in a month.      SECONDARY DISCHARGE DIAGNOSES  Diagnosis: Acute pulmonary embolism  Assessment and Plan of Treatment: Please continue taking eliquis (blood thinner) 10 mg by mouth twice a day from 2/6-2/10 (last day will be 2/10). Starting 2/11, please take eliquis 5 mg by mouth twice a day until you follow-up with the vascular doctor Dr. Gadiel Velasquez.    Please follow-up with your primary care doctor. Please follow-up with vascular doctor Dr. Gadiel Velasquez. Please follow-up with the lung doctor Dr. Julian in a month.     PRINCIPAL DISCHARGE DIAGNOSIS  Diagnosis: Pneumonia due to COVID-19 virus  Assessment and Plan of Treatment: You had signs and symptoms concerning for COVID-19. You tested postive. You were treated with decadron and remdesivir while you were in the hospital. You have been clinically stable and deemed safe for discharge to continue your treatment course and quarantine. While you are at home you should stay in your own room whenever possible and wear a mask as much as possible. Please be sure to self-quarantine at home until 14 days have passed since your first positive COVID test from this admission. You tested positive on 1/31 so please self-quarantine until 2/14.  If you have to be in the same room as someone else, you should both wear a mask. If you share a restroom, you should wipe it down with bleach wipes between each use. Please continue to practice social distancing and good hand hygiene. You will also have a pulse oximeter device to help you monitor your oxygen levels and heart rate.  If you do experience worsening shortness of breath at home, start to experience new chest pain or new leg pain, please call your doctor and consider coming back.  Please continue taking dexamethasone (steroid) for 5 more days (2/6-2/10)- 6 mg once daily by mouth, last day will be 2/10.   Please follow-up with your primary care doctor. Please follow-up with vascular surgery- Dr. Gadiel Velasquez telehealth visit on 2/16 at 3:30 PM (you will need to check your email for paperwork to  be filled out, and download the smartphone harshal [employer] Touch[employer]).  Please follow-up with the lung doctor Dr. Julian in a month- you will be called to get this appointment.      SECONDARY DISCHARGE DIAGNOSES  Diagnosis: Acute pulmonary embolism  Assessment and Plan of Treatment: Please continue taking eliquis (blood thinner) 10 mg by mouth twice a day from 2/6-2/10 (last day will be 2/10). Starting 2/11, please take eliquis 5 mg by mouth twice a day until you follow-up with the vascular doctor Dr. Gadiel Velasquez.    Please follow-up with your primary care doctor.  Please follow-up with vascular surgery- Dr. Gadiel Velasquez telehealth visit on 2/16 at 3:30 PM (you will need to check your email for paperwork to  be filled out, and download the smartphone harshal [employer]).  Please follow-up with the lung doctor Dr. Julian in a month- you will be called to get this appointment.

## 2021-02-01 NOTE — PROGRESS NOTE ADULT - SUBJECTIVE AND OBJECTIVE BOX
INTERVAL HPI/OVERNIGHT EVENTS:  O/N: transferred to [facility] and admitted to [facility]  This morning: Patient was seen and examined at bedside. Having inspiratory sternal pain, dyspnea on exertion, and difficulty expectorating/clearing his throat.    VITAL SIGNS:  T(F): 98.3 (02-01-21 @ 06:29)  HR: 86 (02-01-21 @ 06:29)  BP: 115/74 (02-01-21 @ 06:29)  RR: 19 (02-01-21 @ 06:29)  SpO2: 96% (02-01-21 @ 06:29)  Wt(kg): --    PHYSICAL EXAM:    Constitutional: WDWN, NAD, lying comfortably in bed  HEENT: EOMI, sclera non-icteric, dry mucous membranes  Respiratory: CTA b/l  Cardiovascular: RRR, normal S1S2, no M/R/G appreciated  Gastrointestinal: abd soft, nontender, mildly distended   Extremities: Warm, well perfused   Neurological: AAOx3     MEDICATIONS  (STANDING):  dexAMETHasone  Injectable 6 milliGRAM(s) IV Push every 24 hours  enoxaparin Injectable 40 milliGRAM(s) SubCutaneous every 24 hours  remdesivir  IVPB   IV Intermittent     MEDICATIONS  (PRN):  acetaminophen   Tablet .. 650 milliGRAM(s) Oral every 6 hours PRN Temp greater or equal to 38C (100.4F)      Allergies    No Known Allergies    Intolerances        LABS:                        13.2   7.80  )-----------( 250      ( 31 Jan 2021 13:56 )             42.0     01-31    137  |  98  |  13  ----------------------------<  113<H>  3.5   |  24  |  1.02    Ca    8.8      31 Jan 2021 13:56  Phos  2.2     01-31  Mg     2.5     01-31    TPro  7.6  /  Alb  3.7  /  TBili  0.6  /  DBili  x   /  AST  70<H>  /  ALT  40  /  AlkPhos  64  01-31          RADIOLOGY & ADDITIONAL TESTS:  Reviewed

## 2021-02-01 NOTE — DIETITIAN INITIAL EVALUATION ADULT. - OTHER CALCULATIONS
IBW used for calculations as pt >120% of IBW (128%), adjusted for hypermetabolic state, fluids per team

## 2021-02-01 NOTE — H&P ADULT - HISTORY OF PRESENT ILLNESS
50M no significant PMH presents for evaluation of cough, shortness of breath and pleuritic chest discomfort over the last 10 days. Patient reports that he tested positive on 1/21 and has been sheltering in place for the last 10 days. He was evaluated by his primary care physician and was sent in for antibody infusion but unfortunately did not meet the criteria. He also reports that he has had a decreased appetite as well as fatigue, fevers and dyspnea on exertion. Denies orthopnea, PND, weakness, numbness, focal deficits. Endorses initially having diarrhea that has now improved.    In the ED VS T 99.3 HR 79 /86 R 20 91% on RA   Labs: WBC 7 | Hg/Hct 13/42 | Plt 250 | Na 137 K 3.5 |  AST/ALT ALP 70/40 64 | Ferritin 314 D-dimer 538 CRP 11 | Procalcitonin 0.12 |  Imaging: CXR < from: Xray Chest 1 View- PORTABLE-Urgent (01.31.21 @ 14:04)  Hazy airspace opacity peripheral aspect of the left lower lobe, concerning for viral pneumonia such as COVID-19    ED Course: Dexamethasone 6mg, 1L LR, and Tylenol 650mg.

## 2021-02-01 NOTE — DISCHARGE NOTE PROVIDER - NSDCFUADDAPPT_GEN_ALL_CORE_FT
Please follow-up with vascular surgery- Dr. Gadiel Velasquez telehealth visit on 2/16 at 3:30 PM (you will need to check your email for paperwork to  be filled out, and download the smartphone harshal [employer]).

## 2021-02-01 NOTE — DIETITIAN INITIAL EVALUATION ADULT. - PROBLEM SELECTOR PLAN 1
91% on RA started on Remdesivir and Dexamethasone and [facility]. CXR consistent with COVID.  - Remdesivir  - Dexamethasone  - Supplemental O2  - Lovenox DVT ppx

## 2021-02-01 NOTE — DISCHARGE NOTE PROVIDER - HOSPITAL COURSE
#Discharge: do not delete    Patient is a __  M/F with a PMH of _____  Presented with _____, found to have _____  Problem List/Main Diagnoses (problem-based):   Inpatient treatment course:   New medications:   Labs to be followed outpatient:   Exam to be followed outpatient:    #Discharge: do not delete  50M with glaucoma presents for evaluation of cough, shortness of breath and pleuritic chest discomfort over the last 10 days PTA, admitted to [facility] for acute hypoxic respiratory failure 2/2 COVID, found to have submassive PE w/ RHS on CT, -> stepped up to COVID tele, started on heparin gtt and transitioned to eliquis for PE treatment, echo with no evidence of rv failure or strain, no endovascular intervention as per pulm/PERC team. Stable for stepdown to [facility] on 2/4, satting well on 3 L NC, now weaned to room air.     Problem List/Main Diagnoses (problem-based):    #Pulmonary embolism  -New PEs diagnosed on CTA 2/2/21 with evid. of R heart strain on CT- Extensive pulmonary thromboemboli in the distal bilateral main pulmonary arteries, truncus anterior, right interlobar, and left upper and lower lobar branches, as well as segmental and subsegmental branches bilaterally.  -however, on TTE on 2/3- no evidence of right heart strain  -Started on heparin gtt 2/2/21 PM and transitioned to eliquis on 2/3 (10 mg BID for 7 days, 5 mg BID after)  -echo on 2/3- TTE bedside- Normal left and right ventricular size and systolic function in limited views. There was insufficient tricuspid regurgitation detected from which to calculate pulmonary artery systolic pressure.  -pulm/PERT signed off- PESI score 79, which predicts a Class II risk (1.7-3.5% 30 day mortality),  no evidence of rv failure or strain on echo, no endovascular intervention  - please arrange for outpatient pulmonology f/u in 1 month from discharge with Dr. Julian.     #Acute respiratory failure with hypoxia 2/2 COVID-19  -91% on RA started on Remdesivir and Dexamethasone 2/1.  -CT chest/ CXR consistent with COVID- Patchy and groundglass opacities throughout both lungs  - Remdesivir x5 days (2/1-2/5)  - Dexamethasone x10 days (2/1-2/10)  - Supplemental O2  - incentive spirometer  -wean O2 as tolerated.     #Normocytic anemia  -Likely ACD, no signs of active/overt bleed    #Leukocytosis  WBC  5  ->11.89 (2/2). Suspect 2/2 decadron.   -monitor CBC with diff and signs of worsening infx.     #Glaucoma  -Familial. Multiple family members who have glaucoma. Takes timolol in AM and lumigan in PM at home.  -c/w home timolol and lumigan eye drops.     New medications:   Labs to be followed outpatient:   Exam to be followed outpatient:    #Discharge: do not delete  50M with glaucoma presents for evaluation of cough, shortness of breath and pleuritic chest discomfort over the last 10 days PTA, admitted to [facility] for acute hypoxic respiratory failure 2/2 COVID, found to have submassive PE w/ RHS on CT, -> stepped up to COVID tele, started on heparin gtt and transitioned to eliquis for PE treatment, echo with no evidence of rv failure or strain, no endovascular intervention as per pulm/PERC team. Stable for stepdown to [facility] on 2/4, satting well on 3 L NC, now weaned to room air.     Problem List/Main Diagnoses (problem-based):    #Pulmonary embolism  -New PEs diagnosed on CTA 2/2/21 with evid. of R heart strain on CT- Extensive pulmonary thromboemboli in the distal bilateral main pulmonary arteries, truncus anterior, right interlobar, and left upper and lower lobar branches, as well as segmental and subsegmental branches bilaterally.  -however, on TTE on 2/3- no evidence of right heart strain  -Started on heparin gtt 2/2/21 PM and transitioned to eliquis on 2/3 (10 mg BID for 7 days, 5 mg BID after)  -echo on 2/3- TTE bedside- Normal left and right ventricular size and systolic function in limited views. There was insufficient tricuspid regurgitation detected from which to calculate pulmonary artery systolic pressure.  -pulm/PERT signed off- PESI score 79, which predicts a Class II risk (1.7-3.5% 30 day mortality),  no evidence of rv failure or strain on echo, no endovascular intervention  - please arrange for outpatient pulmonology f/u in 1 month from discharge with Dr. Julian.     #Acute respiratory failure with hypoxia 2/2 COVID-19  -91% on RA started on Remdesivir and Dexamethasone 2/1.  -CT chest/ CXR consistent with COVID- Patchy and groundglass opacities throughout both lungs  - Remdesivir x5 days (2/1-2/5)  - Dexamethasone x10 days (2/1-2/10)  - Supplemental O2  - incentive spirometer  -wean O2 as tolerated.     #Normocytic anemia  -Likely ACD, no signs of active/overt bleed    #Leukocytosis  WBC  5  ->11.89 (2/2). Suspect 2/2 decadron.   -monitor CBC with diff and signs of worsening infx.     #Glaucoma  -Familial. Multiple family members who have glaucoma. Takes timolol in AM and lumigan in PM at home.  -c/w home timolol and lumigan eye drops.     New medications: eliquis, dexamethasone  Labs to be followed outpatient: none  Exam to be followed outpatient: none   #Discharge: do not delete  50M with glaucoma presents for evaluation of cough, shortness of breath and pleuritic chest discomfort over the last 10 days PTA, admitted to [facility] for acute hypoxic respiratory failure 2/2 COVID, found to have submassive PE w/ RHS on CT, -> stepped up to COVID tele, started on heparin gtt and transitioned to eliquis for PE treatment, echo with no evidence of rv failure or strain, no endovascular intervention as per pulm/PERC team. Stable for stepdown to [facility] on 2/4, satting well on 3 L NC, now weaned to room air.     Problem List/Main Diagnoses (problem-based):    #Pulmonary embolism  -New PEs diagnosed on CTA 2/2/21 with evid. of R heart strain on CT- Extensive pulmonary thromboemboli in the distal bilateral main pulmonary arteries, truncus anterior, right interlobar, and left upper and lower lobar branches, as well as segmental and subsegmental branches bilaterally.  -however, on TTE on 2/3- no evidence of right heart strain  -Started on heparin gtt 2/2/21 PM and transitioned to eliquis on 2/3 (10 mg BID for 7 days, 5 mg BID after)  -echo on 2/3- TTE bedside- Normal left and right ventricular size and systolic function in limited views. There was insufficient tricuspid regurgitation detected from which to calculate pulmonary artery systolic pressure.  -pulm/PERT signed off- PESI score 79, which predicts a Class II risk (1.7-3.5% 30 day mortality),  no evidence of rv failure or strain on echo, no endovascular intervention  -pt. to be discharged 2/5 on eliquis (2/6-2/10) 10 mg twice a day, and then starting 2/11 will take eliquis 5 mg twice a day after that.   - please arrange for outpatient pulmonology f/u in 1 month from discharge with Dr. Julian.   -Please follow-up with vascular surgery- Dr. Gadiel Velasquez telehealth visit on 2/16 at 3:30 PM (you will need to check your email for paperwork to be filled out, and download the smartphone harshal [employer]).      #Acute respiratory failure with hypoxia 2/2 COVID-19  -91% on RA started on Remdesivir and Dexamethasone 2/1.  -CT chest/ CXR consistent with COVID- Patchy and groundglass opacities throughout both lungs  - Remdesivir x5 days (2/1-2/5)  - Dexamethasone x10 days (2/1-2/10)  - Supplemental O2  - incentive spirometer  -wean O2 as tolerated.     #Normocytic anemia  -Likely ACD, no signs of active/overt bleed    #Leukocytosis  WBC  5  ->12k. Suspect 2/2 decadron.   -monitored CBC with diff, no signs of infection    #Glaucoma  -Familial. Multiple family members who have glaucoma. Takes timolol in AM and lumigan in PM at home.  -c/w home timolol and lumigan eye drops.     New medications: eliquis, dexamethasone  Labs to be followed outpatient: none  Exam to be followed outpatient: none   #Discharge: do not delete  50M with glaucoma presents for evaluation of cough, shortness of breath and pleuritic chest discomfort over the last 10 days PTA, admitted to [facility] for acute hypoxic respiratory failure 2/2 COVID, found to have submassive PE w/ RHS on CT, -> stepped up to COVID tele, started on heparin gtt and transitioned to eliquis for PE treatment, echo with no evidence of rv failure or strain, no endovascular intervention as per pulm/PERC team. Stable for stepdown to [facility] on 2/4, satting well on 3 L NC, now weaned to room air.     Problem List/Main Diagnoses (problem-based):    #Pulmonary embolism  -New PEs diagnosed on CTA 2/2/21 with evid. of R heart strain on CT- Extensive pulmonary thromboemboli in the distal bilateral main pulmonary arteries, truncus anterior, right interlobar, and left upper and lower lobar branches, as well as segmental and subsegmental branches bilaterally.  -however, on TTE on 2/3- no evidence of right heart strain  -Started on heparin gtt 2/2/21 PM and transitioned to eliquis on 2/3 (10 mg BID for 7 days, 5 mg BID after)  -echo on 2/3- TTE bedside- Normal left and right ventricular size and systolic function in limited views. There was insufficient tricuspid regurgitation detected from which to calculate pulmonary artery systolic pressure.  -pulm/PERT signed off- PESI score 79, which predicts a Class II risk (1.7-3.5% 30 day mortality),  no evidence of rv failure or strain on echo, no endovascular intervention  -pt. to be discharged 2/5 on eliquis (2/6-2/10) 10 mg twice a day, and then starting 2/11 will take eliquis 5 mg twice a day after that.   - please arrange for outpatient pulmonology f/u in 1 month from discharge with Dr. Julian.   -Please follow-up with vascular surgery- Dr. Gadiel Velasquez telehealth visit on 2/16 at 3:30 PM (you will need to check your email for paperwork to be filled out, and download the smartphone harshal [employer]).      #Acute respiratory failure with hypoxia 2/2 COVID-19  -91% on RA started on Remdesivir and Dexamethasone 2/1.  -CT chest/ CXR consistent with COVID- Patchy and groundglass opacities throughout both lungs  - Remdesivir x5 days (2/1-2/5)  - Dexamethasone x10 days (2/1-2/10)  - Supplemental O2  - incentive spirometer  -wean O2 as tolerated.     #Normocytic anemia  -Likely ACD, no signs of active/overt bleed    #Leukocytosis  WBC  5  ->12k. Suspect 2/2 decadron.   -monitored CBC with diff, no signs of infection    #Glaucoma  -Familial. Multiple family members who have glaucoma. Takes timolol in AM and lumigan in PM at home.  -c/w home timolol and lumigan eye drops.     New medications: eliquis, dexamethasone  Labs to be followed outpatient: none  Exam to be followed outpatient: none    ATTENDING ATTESTATION  Date of Service: 2/5/21    I interviewed and examined Nick Palomares on the day of discharge and greater than 30 minutes were spent by the team on processing their hospital discharge and coordinating their post-hospital care.     I discussed the care plan with the resident team. I agree with the discharge plan as outlined in the Discharge Summary. I have personally reviewed the above discharge summary and made changes where necessary, and as noted below.    50YOM with history of glaucoma, obesity (BMI 30.4) admitted for acute hypoxemic respiratory failure 2/2 COVID19 pneumonia. Hospital course complicated by extensive bilateral PE, leading to transfer to telemetry unit for closer monitoring. Clinically improved with anticoagulation and dexamethasone/remdesivir, and transferred to [facility] 2/4.    Doing well this morning. Finished remdesivir this morning. SpO2 normal on room air at rest though did desat to 85% with ambulation with me so will require home O2 with exertion. Medically ready for discharge today with home O2 - will be discharged to finish 10 days dexamethasone, Eliquis load/maintenance dosing x 3-6 months, pulm/vascular/PCP followup and pulse ox.      Physical exam on day of discharge:  General: pleasant, appropriate, no acute distress. Participating appropriately in interview.  HEENT: NC/AT, MMM  Neck: soft, supple, no lymphadenopathy  Cardiac: regular rhythm, normal rate, normal s1/s2, no murmurs, rubs, or gallops  Lungs: clear to auscultation bilaterally without wheezes, rales, or rhonchi. Normal work of breathing. Speaking in complete sentences. Ambulates well but desats to 85% on room air, improved with O2 and rest  Abdomen: soft, nontender, nondistended. Bowel sounds present and normoactive.   Extremities: moving all extremities. No edema.  Neuro: awake, alert, oriented x4. Follows commands. Moving all extremities. Sensation intact. Normal gait  Psych: no evidence of AVH.    Getachew García MD  Attending Hospitalist

## 2021-02-01 NOTE — DIETITIAN INITIAL EVALUATION ADULT. - OTHER INFO
50M no significant PMH presents for evaluation of cough, shortness of breath and pleuritic chest discomfort over the last 10 days admitted for acute hypoxic respiratory failure 2/2 COVID  (91% on RA)     Unable to conduct a face to face interview or nutrition-focused physical exam due to limited contact restrictions related to the pt's medical condition and isolation precautions. Spoke to pt via cell phone #[phone]. Pt reports decreased appetite for 5-6days PTA 2/2 not feeling well. Reports he was able to eat dinner last night and 100% of meals day, reports appetite is improving. RD confirmed pt knows how to order meals. No height or weight in EMR, pt reports height is 70inches and UBW ~210-212lbs. Denies wt loss. Please obtain current wt and add to EMR. Pt denies N/V/D/C. Last BM 1/31. Skin wdl. Please see recs below. Will continue to follow per RD protocol.

## 2021-02-01 NOTE — H&P ADULT - PROBLEM SELECTOR PLAN 1
91% on RA started on Remdesivir and Dexamethasone and [facility]. CXR consistent with COVID.  - Remdesivir  - Dexamethasone  - Supplemental O2  - Lovenox DVT ppx

## 2021-02-01 NOTE — DIETITIAN INITIAL EVALUATION ADULT. - PROBLEM SELECTOR PLAN 3
F: s/p 1L LR in ED  none  E: Replete PRN K<4, Mg<2  N: Regular    DVT: Lovenox  GI: none    CODE: Full  Dispo: [facility]

## 2021-02-01 NOTE — DISCHARGE NOTE PROVIDER - CARE PROVIDERS DIRECT ADDRESSES
,[email or web address] ,[email or web address] ,[email or web address]

## 2021-02-01 NOTE — H&P ADULT - NSHPPHYSICALEXAM_GEN_ALL_CORE
GENERAL: Middle aged AA male sitting in bed, mildly anxious in NAD, speaks in full sentences, no signs of respiratory distress  HEAD:  Atraumatic, Normocephalic  EYES: EOMI, PERRLA, conjunctiva and sclera clear  NECK: Supple, No JVD  CHEST/LUNG: Fine crackles L>R  HEART: Regular rate and rhythm; No murmurs;   ABDOMEN: Soft, Nontender, Nondistended; Bowel sounds present; No guarding  EXTREMITIES:  2+ Peripheral Pulses, No cyanosis or edema  PSYCH: AAOx3  NEUROLOGY: non-focal  SKIN: No rashes or lesions

## 2021-02-01 NOTE — H&P ADULT - PROBLEM SELECTOR PLAN 3
F: s/p 1L LR in ED  none  E: Replete PRN K<4, Mg<2  N: Regular    DVT: Lovenox  GI: none    CODE: Full  Dispo: [facility]

## 2021-02-01 NOTE — DISCHARGE NOTE PROVIDER - NSDCFUSCHEDAPPT_GEN_ALL_CORE_FT
KARIN SAHA ; 04/01/2021 ; [facility] [address] [place] [address] MARIA A KARIN ; 02/16/2021 ; [facility] S[facility] [address] [facility] ; 04/01/2021 ; [facility] Internal Med [facility]

## 2021-02-01 NOTE — H&P ADULT - NSHPLABSRESULTS_GEN_ALL_CORE
LABS:                        13.2   7.80  )-----------( 250      ( 31 Jan 2021 13:56 )             42.0     31 Jan 2021 13:56    137    |  98     |  13     ----------------------------<  113    3.5     |  24     |  1.02     Ca    8.8        31 Jan 2021 13:56  Phos  2.2       31 Jan 2021 13:56  Mg     2.5       31 Jan 2021 13:56    TPro  7.6    /  Alb  3.7    /  TBili  0.6    /  DBili  x      /  AST  70     /  ALT  40     /  AlkPhos  64     31 Jan 2021 13:56

## 2021-02-01 NOTE — ED ADULT NURSE NOTE - NAME OF THE PERSON WHO RECEIVED REPORT AT THE FACILITY THE PATIENT IS TRANSFERRING TO
Report given to [facility] + 4 URIS RN. Report given to [facility] Report given to [facility] + FORTINO Tapia RN.

## 2021-02-01 NOTE — PROGRESS NOTE ADULT - PROBLEM SELECTOR PROBLEM 1
Acute respiratory failure, unspecified whether with hypoxia or hypercapnia Acute respiratory failure with hypoxia

## 2021-02-02 DIAGNOSIS — I26.99 OTHER PULMONARY EMBOLISM WITHOUT ACUTE COR PULMONALE: ICD-10-CM

## 2021-02-02 DIAGNOSIS — H40.9 UNSPECIFIED GLAUCOMA: ICD-10-CM

## 2021-02-02 LAB
ALBUMIN SERPL ELPH-MCNC: 3.2 G/DL — LOW (ref 3.3–5)
ALP SERPL-CCNC: 64 U/L — SIGNIFICANT CHANGE UP (ref 40–120)
ALT FLD-CCNC: 29 U/L — SIGNIFICANT CHANGE UP (ref 10–45)
ANION GAP SERPL CALC-SCNC: 11 MMOL/L — SIGNIFICANT CHANGE UP (ref 5–17)
APTT BLD: 30.4 SEC — SIGNIFICANT CHANGE UP (ref 27.5–35.5)
AST SERPL-CCNC: 36 U/L — SIGNIFICANT CHANGE UP (ref 10–40)
BASOPHILS # BLD AUTO: 0.01 K/UL — SIGNIFICANT CHANGE UP (ref 0–0.2)
BASOPHILS NFR BLD AUTO: 0.1 % — SIGNIFICANT CHANGE UP (ref 0–2)
BILIRUB SERPL-MCNC: 0.4 MG/DL — SIGNIFICANT CHANGE UP (ref 0.2–1.2)
BUN SERPL-MCNC: 20 MG/DL — SIGNIFICANT CHANGE UP (ref 7–23)
CALCIUM SERPL-MCNC: 8.3 MG/DL — LOW (ref 8.4–10.5)
CHLORIDE SERPL-SCNC: 104 MMOL/L — SIGNIFICANT CHANGE UP (ref 96–108)
CK MB CFR SERPL CALC: 1.5 NG/ML — SIGNIFICANT CHANGE UP (ref 0–6.7)
CK SERPL-CCNC: 310 U/L — HIGH (ref 30–200)
CO2 SERPL-SCNC: 29 MMOL/L — SIGNIFICANT CHANGE UP (ref 22–31)
CREAT SERPL-MCNC: 0.89 MG/DL — SIGNIFICANT CHANGE UP (ref 0.5–1.3)
CRP SERPL-MCNC: 5.02 MG/DL — HIGH (ref 0–0.4)
D DIMER BLD IA.RAPID-MCNC: 6853 NG/ML DDU — HIGH
EOSINOPHIL # BLD AUTO: 0 K/UL — SIGNIFICANT CHANGE UP (ref 0–0.5)
EOSINOPHIL NFR BLD AUTO: 0 % — SIGNIFICANT CHANGE UP (ref 0–6)
FERRITIN SERPL-MCNC: 255 NG/ML — SIGNIFICANT CHANGE UP (ref 30–400)
GLUCOSE SERPL-MCNC: 174 MG/DL — HIGH (ref 70–99)
HCT VFR BLD CALC: 37.8 % — LOW (ref 39–50)
HGB BLD-MCNC: 11.4 G/DL — LOW (ref 13–17)
IMM GRANULOCYTES NFR BLD AUTO: 0.5 % — SIGNIFICANT CHANGE UP (ref 0–1.5)
INR BLD: 1.15 — SIGNIFICANT CHANGE UP (ref 0.88–1.16)
LYMPHOCYTES # BLD AUTO: 0.89 K/UL — LOW (ref 1–3.3)
LYMPHOCYTES # BLD AUTO: 7.5 % — LOW (ref 13–44)
MAGNESIUM SERPL-MCNC: 2.4 MG/DL — SIGNIFICANT CHANGE UP (ref 1.6–2.6)
MCHC RBC-ENTMCNC: 25 PG — LOW (ref 27–34)
MCHC RBC-ENTMCNC: 30.2 GM/DL — LOW (ref 32–36)
MCV RBC AUTO: 82.9 FL — SIGNIFICANT CHANGE UP (ref 80–100)
MONOCYTES # BLD AUTO: 0.9 K/UL — SIGNIFICANT CHANGE UP (ref 0–0.9)
MONOCYTES NFR BLD AUTO: 7.6 % — SIGNIFICANT CHANGE UP (ref 2–14)
NEUTROPHILS # BLD AUTO: 10.03 K/UL — HIGH (ref 1.8–7.4)
NEUTROPHILS NFR BLD AUTO: 84.3 % — HIGH (ref 43–77)
NRBC # BLD: 0 /100 WBCS — SIGNIFICANT CHANGE UP (ref 0–0)
NT-PROBNP SERPL-SCNC: 45 PG/ML — SIGNIFICANT CHANGE UP (ref 0–300)
PHOSPHATE SERPL-MCNC: 3.6 MG/DL — SIGNIFICANT CHANGE UP (ref 2.5–4.5)
PLATELET # BLD AUTO: 267 K/UL — SIGNIFICANT CHANGE UP (ref 150–400)
POTASSIUM SERPL-MCNC: 4.2 MMOL/L — SIGNIFICANT CHANGE UP (ref 3.5–5.3)
POTASSIUM SERPL-SCNC: 4.2 MMOL/L — SIGNIFICANT CHANGE UP (ref 3.5–5.3)
PROCALCITONIN SERPL-MCNC: 0.12 NG/ML — HIGH (ref 0.02–0.1)
PROT SERPL-MCNC: 6.8 G/DL — SIGNIFICANT CHANGE UP (ref 6–8.3)
PROTHROM AB SERPL-ACNC: 13.7 SEC — HIGH (ref 10.6–13.6)
RBC # BLD: 4.56 M/UL — SIGNIFICANT CHANGE UP (ref 4.2–5.8)
RBC # FLD: 13.1 % — SIGNIFICANT CHANGE UP (ref 10.3–14.5)
SODIUM SERPL-SCNC: 144 MMOL/L — SIGNIFICANT CHANGE UP (ref 135–145)
TROPONIN T SERPL-MCNC: <0.01 NG/ML — SIGNIFICANT CHANGE UP (ref 0–0.01)
WBC # BLD: 11.89 K/UL — HIGH (ref 3.8–10.5)
WBC # FLD AUTO: 11.89 K/UL — HIGH (ref 3.8–10.5)

## 2021-02-02 PROCEDURE — 99233 SBSQ HOSP IP/OBS HIGH 50: CPT | Mod: GC

## 2021-02-02 PROCEDURE — 71275 CT ANGIOGRAPHY CHEST: CPT | Mod: 26

## 2021-02-02 PROCEDURE — 99223 1ST HOSP IP/OBS HIGH 75: CPT | Mod: GC

## 2021-02-02 RX ORDER — HEPARIN SODIUM 5000 [USP'U]/ML
1800 INJECTION INTRAVENOUS; SUBCUTANEOUS
Qty: 25000 | Refills: 0 | Status: DISCONTINUED | OUTPATIENT
Start: 2021-02-02 | End: 2021-02-03

## 2021-02-02 RX ORDER — TIMOLOL 0.5 %
1 DROPS OPHTHALMIC (EYE) EVERY 24 HOURS
Refills: 0 | Status: DISCONTINUED | OUTPATIENT
Start: 2021-02-02 | End: 2021-02-05

## 2021-02-02 RX ORDER — BIMATOPROST 0.3 MG/ML
1 SOLUTION/ DROPS OPHTHALMIC
Qty: 0 | Refills: 0 | DISCHARGE

## 2021-02-02 RX ORDER — TIMOLOL 0.5 %
1 DROPS OPHTHALMIC (EYE)
Qty: 0 | Refills: 0 | DISCHARGE

## 2021-02-02 RX ORDER — LATANOPROST 0.05 MG/ML
1 SOLUTION/ DROPS OPHTHALMIC; TOPICAL AT BEDTIME
Refills: 0 | Status: DISCONTINUED | OUTPATIENT
Start: 2021-02-02 | End: 2021-02-05

## 2021-02-02 RX ADMIN — HEPARIN SODIUM 18 UNIT(S)/HR: 5000 INJECTION INTRAVENOUS; SUBCUTANEOUS at 19:57

## 2021-02-02 RX ADMIN — Medication 1 DROP(S): at 10:58

## 2021-02-02 RX ADMIN — Medication 6 MILLIGRAM(S): at 12:59

## 2021-02-02 RX ADMIN — Medication 1 PACKET(S): at 10:58

## 2021-02-02 RX ADMIN — ENOXAPARIN SODIUM 40 MILLIGRAM(S): 100 INJECTION SUBCUTANEOUS at 05:52

## 2021-02-02 RX ADMIN — Medication 100 MILLIGRAM(S): at 06:56

## 2021-02-02 RX ADMIN — REMDESIVIR 500 MILLIGRAM(S): 5 INJECTION INTRAVENOUS at 05:52

## 2021-02-02 NOTE — PROGRESS NOTE ADULT - ASSESSMENT
50M with glaucoma presents for evaluation of cough, shortness of breath and pleuritic chest discomfort over the last 10 days PTA, admitted for acute hypoxic respiratory failure 2/2 COVID

## 2021-02-02 NOTE — PROGRESS NOTE ADULT - PROBLEM SELECTOR PLAN 1
91% on RA started on Remdesivir and Dexamethasone 2/1 per patient. CXR consistent with COVID. DDimer increased x 10 - dopplers 2/1 negative,, consider CTPE if worsening hypoxia or signs of PE  - Remdesivir x5 days (2/1-2/5)  - Dexamethasone x10 days (2/1-2/10)  - Supplemental O2  - incentive spirometer  - Lovenox DVT ppx New PEs diagnosed on CTA 2/2/21 with evid. of R heart strain on CT. Pt HDUS at this time. Trops, ckmb negative, CK mildly elevated. Started on heparin gtt 2/2/21 PM.  -repeat PTTs and dose heparin per nomogram  -f/u echo for further evaluation of heart fxn, strain  -f/u BNP  -no emergent intervention at this time per pulm, but wld ambulate in AM to assess his symptoms and in comb. with echo determine if may benefit from procedure  -f/u pulm recs New PEs diagnosed on CTA 2/2/21 with evid. of R heart strain on CT, offical read to follow. Pt HDUS at this time. Trops, ckmb negative, CK mildly elevated. Started on heparin gtt 2/2/21 PM.  -repeat PTTs and dose heparin per nomogram  -f/u echo for further evaluation of heart fxn, strain  -f/u BNP  -no emergent intervention at this time per pulm, but wld ambulate in AM to assess his symptoms and in comb. with echo determine if may benefit from procedure  -f/u pulm/PERT recs

## 2021-02-02 NOTE — CONSULT NOTE ADULT - ASSESSMENT
This is a 49 y/o man who presents with acute hypoxemic respiratory failure due to COVID and was found to have bilateral pulmonary emboli with an extensive clot burden and RHS on CTA.

## 2021-02-02 NOTE — CONSULT NOTE ADULT - SUBJECTIVE AND OBJECTIVE BOX
ICU Consult Note     Brief HPI:  50M no significant PMH presents for evaluation of cough, shortness of breath and pleuritic chest discomfort over the last 10 days. Patient reports that he tested positive on 1/21 and has been sheltering in place for the last 10 days. He was evaluated by his primary care physician and was sent in for antibody infusion but unfortunately did not meet the criteria. He also reports that he has had a decreased appetite as well as fatigue, fevers and dyspnea on exertion. Denies orthopnea, PND, weakness, numbness, focal deficits. Endorses initially having diarrhea that has now improved.    In the ED VS T 99.3 HR 79 /86 R 20 91% on RA   Labs: WBC 7 | Hg/Hct 13/42 | Plt 250 | Na 137 K 3.5 |  AST/ALT ALP 70/40 64 | Ferritin 314 D-dimer 538 CRP 11 | Procalcitonin 0.12 |  Imaging: CXR < from: Xray Chest 1 View- PORTABLE-Urgent (01.31.21 @ 14:04)  Hazy airspace opacity peripheral aspect of the left lower lobe, concerning for viral pneumonia such as COVID-19  Medications: Dexamethasone 6mg, 1L LR, and Tylenol 650mg.    Patient was [facility] to [facility] for further management, started on remdesivir and dexamethasone as per COVID protocol. LE dopplers found to be negative. Due to increasing D dimer levels, CTPE was done which showed:     ICU was consulted for submassive PE- pending offical read by radiologist.     Allergies    No Known Allergies    Intolerances      Home Medications:  Lumigan 0.01% ophthalmic solution: 1 drop(s) to each affected eye once a day (in the evening) (02 Feb 2021 08:29)  timolol ophthalmic: 1 drop(s) to each affected eye once a day (0.5% concentration) (02 Feb 2021 08:29)      SOCIAL HX:     Smoking          ETOH/Illicit drugs          Occupation    PAST MEDICAL & SURGICAL HISTORY:  Glaucoma        FAMILY HISTORY:  No pertinent family history in first degree relatives    :    No known cardiovascular or pulmonary family history     ROS:  See HPI     PHYSICAL EXAM    ICU Vital Signs Last 24 Hrs  T(C): 37.1 (02 Feb 2021 09:01), Max: 37.4 (02 Feb 2021 06:04)  T(F): 98.7 (02 Feb 2021 09:01), Max: 99.4 (02 Feb 2021 06:04)  HR: 77 (02 Feb 2021 09:01) (74 - 84)  BP: 127/70 (02 Feb 2021 09:01) (124/69 - 127/70)  BP(mean): --  ABP: --  ABP(mean): --  RR: 18 (02 Feb 2021 15:46) (18 - 20)  SpO2: 94% (02 Feb 2021 15:46) (83% - 95%)    Constitutional: Middle aged male lying in bed in no acute distress  HEENT: EOMI   Respiratory:  B/L air entry equal, no crackles, rhonchi or wheeze  Cardiovascular: RRR, normal S1S2, no M/R/G appreciated  Gastrointestinal: abd soft, nontender, nondistended   Extremities: legs warm, well perfused   Neurological: AAOx3, no focal deficits      LABS:                          11.4   11.89 )-----------( 267      ( 02 Feb 2021 06:36 )             37.8                                               02-02    144  |  104  |  20  ----------------------------<  174<H>  4.2   |  29  |  0.89    Ca    8.3<L>      02 Feb 2021 06:36  Phos  3.6     02-02  Mg     2.4     02-02    TPro  6.8  /  Alb  3.2<L>  /  TBili  0.4  /  DBili  x   /  AST  36  /  ALT  29  /  AlkPhos  64  02-02      PT/INR - ( 01 Feb 2021 08:15 )   PT: 12.8 sec;   INR: 1.07          PTT - ( 01 Feb 2021 08:15 )  PTT:30.1 sec                                           CARDIAC MARKERS ( 02 Feb 2021 06:36 )  x     / <0.01 ng/mL / 310 U/L / x     / 1.5 ng/mL                                            LIVER FUNCTIONS - ( 02 Feb 2021 06:36 )  Alb: 3.2 g/dL / Pro: 6.8 g/dL / ALK PHOS: 64 U/L / ALT: 29 U/L / AST: 36 U/L / GGT: x                                              CXR: IMPRESSION: Hazy airspace opacity peripheral aspect of the left lower lobe, concerning for viral pneumonia such as COVID 19.    CT Chest:   IMPRESSION:    Patchy and groundglass opacities throughout both lungs corresponding to the known COVID-19 pneumonia.    Coronary artery atherosclerotic disease.    ECHO: pending     MEDICATIONS  (STANDING):  dexAMETHasone  Injectable 6 milliGRAM(s) IV Push every 24 hours  heparin  Infusion 1800 Unit(s)/Hr (18 mL/Hr) IV Continuous <Continuous>  latanoprost 0.005% Ophthalmic Solution 1 Drop(s) Both EYES at bedtime  remdesivir  IVPB 100 milliGRAM(s) IV Intermittent every 24 hours  remdesivir  IVPB   IV Intermittent   timolol 0.5% Solution 1 Drop(s) Both EYES every 24 hours    MEDICATIONS  (PRN):  acetaminophen   Tablet .. 650 milliGRAM(s) Oral every 6 hours PRN Temp greater or equal to 38C (100.4F)  benzonatate 100 milliGRAM(s) Oral three times a day PRN Cough         ICU Consult Note     Brief HPI:  50M no significant PMH presents for evaluation of cough, shortness of breath and pleuritic chest discomfort over the last 10 days. Patient reports that he tested positive on 1/21 and has been sheltering in place for the last 10 days. He was evaluated by his primary care physician and was sent in for antibody infusion but unfortunately did not meet the criteria. He also reports that he has had a decreased appetite as well as fatigue, fevers and dyspnea on exertion. Denies orthopnea, PND, weakness, numbness, focal deficits. Endorses initially having diarrhea that has now improved.    In the ED VS T 99.3 HR 79 /86 R 20 91% on RA   Labs: WBC 7 | Hg/Hct 13/42 | Plt 250 | Na 137 K 3.5 |  AST/ALT ALP 70/40 64 | Ferritin 314 D-dimer 538 CRP 11 | Procalcitonin 0.12 |  Imaging: CXR < from: Xray Chest 1 View- PORTABLE-Urgent (01.31.21 @ 14:04)  Hazy airspace opacity peripheral aspect of the left lower lobe, concerning for viral pneumonia such as COVID-19  Medications: Dexamethasone 6mg, 1L LR, and Tylenol 650mg.    Patient was admitted to [facility] for further management, started on remdesivir and dexamethasone as per COVID protocol. LE dopplers found to be negative. Due to increasing D dimer levels, CTPE was done which showed:     ICU was consulted for submassive PE- pending offical read by radiologist.     Allergies    No Known Allergies    Intolerances      Home Medications:  Lumigan 0.01% ophthalmic solution: 1 drop(s) to each affected eye once a day (in the evening) (02 Feb 2021 08:29)  timolol ophthalmic: 1 drop(s) to each affected eye once a day (0.5% concentration) (02 Feb 2021 08:29)      SOCIAL HX:     Smoking          ETOH/Illicit drugs          Occupation    PAST MEDICAL & SURGICAL HISTORY:  Glaucoma        FAMILY HISTORY:  No pertinent family history in first degree relatives    :    No known cardiovascular or pulmonary family history     ROS:  See HPI     PHYSICAL EXAM    ICU Vital Signs Last 24 Hrs  T(C): 37.1 (02 Feb 2021 09:01), Max: 37.4 (02 Feb 2021 06:04)  T(F): 98.7 (02 Feb 2021 09:01), Max: 99.4 (02 Feb 2021 06:04)  HR: 77 (02 Feb 2021 09:01) (74 - 84)  BP: 127/70 (02 Feb 2021 09:01) (124/69 - 127/70)  BP(mean): --  ABP: --  ABP(mean): --  RR: 18 (02 Feb 2021 15:46) (18 - 20)  SpO2: 94% (02 Feb 2021 15:46) (83% - 95%)    Constitutional: Middle aged male lying in bed in no acute distress  HEENT: EOMI   Respiratory:  B/L air entry equal, no crackles, rhonchi or wheeze  Cardiovascular: RRR, normal S1S2, no M/R/G appreciated  Gastrointestinal: abd soft, nontender, nondistended   Extremities: legs warm, well perfused   Neurological: AAOx3, no focal deficits      LABS:                          11.4   11.89 )-----------( 267      ( 02 Feb 2021 06:36 )             37.8                                               02-02    144  |  104  |  20  ----------------------------<  174<H>  4.2   |  29  |  0.89    Ca    8.3<L>      02 Feb 2021 06:36  Phos  3.6     02-02  Mg     2.4     02-02    TPro  6.8  /  Alb  3.2<L>  /  TBili  0.4  /  DBili  x   /  AST  36  /  ALT  29  /  AlkPhos  64  02-02      PT/INR - ( 01 Feb 2021 08:15 )   PT: 12.8 sec;   INR: 1.07          PTT - ( 01 Feb 2021 08:15 )  PTT:30.1 sec                                           CARDIAC MARKERS ( 02 Feb 2021 06:36 )  x     / <0.01 ng/mL / 310 U/L / x     / 1.5 ng/mL                                            LIVER FUNCTIONS - ( 02 Feb 2021 06:36 )  Alb: 3.2 g/dL / Pro: 6.8 g/dL / ALK PHOS: 64 U/L / ALT: 29 U/L / AST: 36 U/L / GGT: x           IMAGING:                             CXR: IMPRESSION: Hazy airspace opacity peripheral aspect of the left lower lobe, concerning for viral pneumonia such as COVID 19.    CT Chest:   IMPRESSION:    Patchy and groundglass opacities throughout both lungs corresponding to the known COVID-19 pneumonia.    Coronary artery atherosclerotic disease.    ECHO: pending     MEDICATIONS  (STANDING):  dexAMETHasone  Injectable 6 milliGRAM(s) IV Push every 24 hours  heparin  Infusion 1800 Unit(s)/Hr (18 mL/Hr) IV Continuous <Continuous>  latanoprost 0.005% Ophthalmic Solution 1 Drop(s) Both EYES at bedtime  remdesivir  IVPB 100 milliGRAM(s) IV Intermittent every 24 hours  remdesivir  IVPB   IV Intermittent   timolol 0.5% Solution 1 Drop(s) Both EYES every 24 hours    MEDICATIONS  (PRN):  acetaminophen   Tablet .. 650 milliGRAM(s) Oral every 6 hours PRN Temp greater or equal to 38C (100.4F)  benzonatate 100 milliGRAM(s) Oral three times a day PRN Cough

## 2021-02-02 NOTE — CONSULT NOTE ADULT - ASSESSMENT
per Internal Medicine    51 yo M with glaucoma presents for evaluation of cough, shortness of breath and pleuritic chest discomfort over the last 10 days PTA, admitted for acute hypoxic respiratory failure 2/2 COVID     Problem/Plan - 1:  ·  Problem: Acute respiratory failure with hypoxia.  Plan: 91% on RA started on Remdesivir and Dexamethasone 2/1 per patient. CXR consistent with COVID. DDimer increased x 10 - dopplers 2/1 negative,, consider CTPE if worsening hypoxia or signs of PE  - Remdesivir x5 days (2/1-2/5)  - Dexamethasone x10 days (2/1-2/10)  - Supplemental O2  - incentive spirometer  - Lovenox DVT ppx.     Problem/Plan - 2:  ·  Problem: Pneumonia due to COVID-19 virus.  Plan: As above.     Problem/Plan - 3:  ·  Problem: Normocytic anemia.  Plan: Likely ACD, no signs of active/overt bleed    #Leukocytosis.  WBC  5  ->11.89 (2/2). Suspect 2/2 decadron. Cell diff similar to yest.   -monitor CBC with diff and signs of worsening infx.     Problem/Plan - 4:  ·  Problem: Glaucoma.  Plan: Familial. Multiple family members who have glaucoma. Takes timolol in AM and lumigan in PM at home.  -c/w home timolol and lumigan eye drops.     Problem/Plan - 5:  ·  Problem: Nutrition, metabolism, and development symptoms.  Plan: F: cautious fluid repletion in covid  E: Replete PRN K<4, Mg<2  N: Regular  DVT: Lovenox  GI: none  CODE: Full  Dispo: COVID RMF.

## 2021-02-02 NOTE — CONSULT NOTE ADULT - ATTENDING COMMENTS
Patient seen and examined with house-staff during bedside rounds.  Resident note read, including vitals, physical findings, laboratory data, and radiological reports.   Revisions included below.  Direct personal management at bed side and extensive interpretation of the data.  Plan was outlined and discussed in details with the housestaff.  Decision making of high complexity  Action taken for acute disease activity to reflect the level of care provided:  - medication reconciliation  - review laboratory data    The patient had bilateral subsegmental PE.  There is no evidence of right ventricular strain no heart failure.  Cardiac biomarkers are negative.  Patient was started on anticoagulation.  Patient is to be transferred to intermediate care unit.  No evidence for catheter directed intervention.  Follow-up on echocardiogram

## 2021-02-02 NOTE — PROGRESS NOTE ADULT - PROBLEM SELECTOR PLAN 5
F: cautious fluid repletion in covid  E: Replete PRN K<4, Mg<2  N: Regular  DVT: Lovenox  GI: none  CODE: Full  Dispo: COVID RMF Familial. Multiple family members who have glaucoma. Takes timolol in AM and lumigan in PM at home.  -c/w home timolol and lumigan eye drops

## 2021-02-02 NOTE — PROGRESS NOTE ADULT - SUBJECTIVE AND OBJECTIVE BOX
INTERVAL HPI/OVERNIGHT EVENTS:  O/N: pt. requesting cough suppressant, ordered for tessalon perle PRN   This morning: Patient was seen and examined at bedside. Pain in chest when breathes deeply. Some R calf pain. Still some SOB with exertion.    VITAL SIGNS:  T(F): 99.4 (02-02-21 @ 06:04)  HR: 74 (02-02-21 @ 06:04)  BP: 124/69 (02-02-21 @ 06:04)  RR: 20 (02-02-21 @ 06:04)  SpO2: 94% (02-02-21 @ 06:04)  Wt(kg): --    PHYSICAL EXAM:    Constitutional: WDWN, NAD, lying comfortably in bed  HEENT: EOMI   Respiratory: CTA b/l  Cardiovascular: RRR, normal S1S2, no M/R/G appreciated  Gastrointestinal: abd soft, nontender, nondistended   Extremities: legs warm, well perfused   Neurological: AAOx3   Skin: moist, diaphoretic    MEDICATIONS  (STANDING):  dexAMETHasone  Injectable 6 milliGRAM(s) IV Push every 24 hours  enoxaparin Injectable 40 milliGRAM(s) SubCutaneous every 24 hours  latanoprost 0.005% Ophthalmic Solution 1 Drop(s) Both EYES at bedtime  potassium phosphate / sodium phosphate Powder (PHOS-NaK) 1 Packet(s) Oral three times a day with meals  remdesivir  IVPB 100 milliGRAM(s) IV Intermittent every 24 hours  remdesivir  IVPB   IV Intermittent   timolol 0.5% Solution 1 Drop(s) Both EYES every 24 hours    MEDICATIONS  (PRN):  acetaminophen   Tablet .. 650 milliGRAM(s) Oral every 6 hours PRN Temp greater or equal to 38C (100.4F)  benzonatate 100 milliGRAM(s) Oral three times a day PRN Cough      Allergies    No Known Allergies    Intolerances        LABS:                        11.4   11.89 )-----------( 267      ( 02 Feb 2021 06:36 )             37.8     02-02    144  |  104  |  20  ----------------------------<  174<H>  4.2   |  29  |  0.89    Ca    8.3<L>      02 Feb 2021 06:36  Phos  3.6     02-02  Mg     2.4     02-02    TPro  6.8  /  Alb  3.2<L>  /  TBili  0.4  /  DBili  x   /  AST  36  /  ALT  29  /  AlkPhos  64  02-02    PT/INR - ( 01 Feb 2021 08:15 )   PT: 12.8 sec;   INR: 1.07          PTT - ( 01 Feb 2021 08:15 )  PTT:30.1 sec      RADIOLOGY & ADDITIONAL TESTS:  Reviewed INTERVAL HPI/OVERNIGHT EVENTS:  O/N: pt. requesting cough suppressant, ordered for tessalon perle PRN   This morning: Patient was seen and examined at bedside. Pain in chest when breathes deeply. Some R calf pain. Still some SOB with exertion. ROS is otherwise negative    VITAL SIGNS:  T(F): 99.4 (02-02-21 @ 06:04)  HR: 74 (02-02-21 @ 06:04)  BP: 124/69 (02-02-21 @ 06:04)  RR: 20 (02-02-21 @ 06:04)  SpO2: 94% (02-02-21 @ 06:04)  Wt(kg): --    PHYSICAL EXAM:    Constitutional: WDWN, NAD, lying comfortably in bed  HEENT: EOMI   Respiratory: CTA b/l  Cardiovascular: RRR, normal S1S2, no M/R/G appreciated  Gastrointestinal: abd soft, nontender, nondistended   Extremities: legs warm, well perfused   Neurological: AAOx3   Skin: moist, diaphoretic    MEDICATIONS  (STANDING):  dexAMETHasone  Injectable 6 milliGRAM(s) IV Push every 24 hours  enoxaparin Injectable 40 milliGRAM(s) SubCutaneous every 24 hours  latanoprost 0.005% Ophthalmic Solution 1 Drop(s) Both EYES at bedtime  potassium phosphate / sodium phosphate Powder (PHOS-NaK) 1 Packet(s) Oral three times a day with meals  remdesivir  IVPB 100 milliGRAM(s) IV Intermittent every 24 hours  remdesivir  IVPB   IV Intermittent   timolol 0.5% Solution 1 Drop(s) Both EYES every 24 hours    MEDICATIONS  (PRN):  acetaminophen   Tablet .. 650 milliGRAM(s) Oral every 6 hours PRN Temp greater or equal to 38C (100.4F)  benzonatate 100 milliGRAM(s) Oral three times a day PRN Cough      Allergies    No Known Allergies    Intolerances        LABS:                        11.4   11.89 )-----------( 267      ( 02 Feb 2021 06:36 )             37.8     02-02    144  |  104  |  20  ----------------------------<  174<H>  4.2   |  29  |  0.89    Ca    8.3<L>      02 Feb 2021 06:36  Phos  3.6     02-02  Mg     2.4     02-02    TPro  6.8  /  Alb  3.2<L>  /  TBili  0.4  /  DBili  x   /  AST  36  /  ALT  29  /  AlkPhos  64  02-02    PT/INR - ( 01 Feb 2021 08:15 )   PT: 12.8 sec;   INR: 1.07          PTT - ( 01 Feb 2021 08:15 )  PTT:30.1 sec      RADIOLOGY & ADDITIONAL TESTS:  Reviewed ****************** TRANSFER NOTE 4U2 TO COVID Tele ******************    HOSPITAL COURSE:INTERVAL   Pt is a 50M with glaucoma who presented to [facility] 1/31 for evaluation of cough, shortness of breath and pleuritic chest discomfort over the last 10 days PTA, was admitted for acute hypoxic respiratory failure 2/2 [facility] and transferred to [facility] 2/1 for further management. Pt received first dose of remdesivir and dexamethasone 2/1, was satting 96% on 4L NC, 86% on RA ambulating. On 2/2, pt's O2     HPI/OVERNIGHT EVENTS:  O/N: pt. requesting cough suppressant, ordered for tessalon perle PRN   This morning: Patient was seen and examined at bedside. Pain in chest when breathes deeply. Some R calf pain. Still some SOB with exertion. ROS is otherwise negative    VITAL SIGNS:  T(F): 99.4 (02-02-21 @ 06:04)  HR: 74 (02-02-21 @ 06:04)  BP: 124/69 (02-02-21 @ 06:04)  RR: 20 (02-02-21 @ 06:04)  SpO2: 94% (02-02-21 @ 06:04)  Wt(kg): --    PHYSICAL EXAM:    Constitutional: WDWN, NAD, lying comfortably in bed  HEENT: EOMI   Respiratory: CTA b/l  Cardiovascular: RRR, normal S1S2, no M/R/G appreciated  Gastrointestinal: abd soft, nontender, nondistended   Extremities: legs warm, well perfused   Neurological: AAOx3   Skin: moist, diaphoretic    MEDICATIONS  (STANDING):  dexAMETHasone  Injectable 6 milliGRAM(s) IV Push every 24 hours  enoxaparin Injectable 40 milliGRAM(s) SubCutaneous every 24 hours  latanoprost 0.005% Ophthalmic Solution 1 Drop(s) Both EYES at bedtime  potassium phosphate / sodium phosphate Powder (PHOS-NaK) 1 Packet(s) Oral three times a day with meals  remdesivir  IVPB 100 milliGRAM(s) IV Intermittent every 24 hours  remdesivir  IVPB   IV Intermittent   timolol 0.5% Solution 1 Drop(s) Both EYES every 24 hours    MEDICATIONS  (PRN):  acetaminophen   Tablet .. 650 milliGRAM(s) Oral every 6 hours PRN Temp greater or equal to 38C (100.4F)  benzonatate 100 milliGRAM(s) Oral three times a day PRN Cough      Allergies    No Known Allergies    Intolerances        LABS:                        11.4   11.89 )-----------( 267      ( 02 Feb 2021 06:36 )             37.8     02-02    144  |  104  |  20  ----------------------------<  174<H>  4.2   |  29  |  0.89    Ca    8.3<L>      02 Feb 2021 06:36  Phos  3.6     02-02  Mg     2.4     02-02    TPro  6.8  /  Alb  3.2<L>  /  TBili  0.4  /  DBili  x   /  AST  36  /  ALT  29  /  AlkPhos  64  02-02    PT/INR - ( 01 Feb 2021 08:15 )   PT: 12.8 sec;   INR: 1.07          PTT - ( 01 Feb 2021 08:15 )  PTT:30.1 sec      RADIOLOGY & ADDITIONAL TESTS:  Reviewed ****************** TRANSFER NOTE 4U2 TO COVID Tele ******************    HOSPITAL COURSE:INTERVAL   Pt is a 50M with glaucoma who presented to [facility] 1/31 for evaluation of cough, shortness of breath and pleuritic chest discomfort over the last 10 days PTA, was admitted for acute hypoxic respiratory failure 2/2 [facility] and transferred to [facility] 2/1 for further management. Pt received first dose of remdesivir and dexamethasone 2/1, was satting 96% on 4L NC, 86% on RA ambulating. On 2/2, pt's had increasing O2 requirements 86% RA at rest, 83% ambulating, dDimer had gone up from 538 -> 5000+, and pt had ongoing inspiratory chest pain    HPI/OVERNIGHT EVENTS:  O/N: pt. requesting cough suppressant, ordered for tessalon perle PRN   This morning: Patient was seen and examined at bedside. Pain in chest when breathes deeply. Some R calf pain. Still some SOB with exertion. ROS is otherwise negative    VITAL SIGNS:  T(F): 99.4 (02-02-21 @ 06:04)  HR: 74 (02-02-21 @ 06:04)  BP: 124/69 (02-02-21 @ 06:04)  RR: 20 (02-02-21 @ 06:04)  SpO2: 94% (02-02-21 @ 06:04)  Wt(kg): --    PHYSICAL EXAM:    Constitutional: WDWN, NAD, lying comfortably in bed  HEENT: EOMI   Respiratory: CTA b/l  Cardiovascular: RRR, normal S1S2, no M/R/G appreciated  Gastrointestinal: abd soft, nontender, nondistended   Extremities: legs warm, well perfused   Neurological: AAOx3   Skin: moist, diaphoretic    MEDICATIONS  (STANDING):  dexAMETHasone  Injectable 6 milliGRAM(s) IV Push every 24 hours  enoxaparin Injectable 40 milliGRAM(s) SubCutaneous every 24 hours  latanoprost 0.005% Ophthalmic Solution 1 Drop(s) Both EYES at bedtime  potassium phosphate / sodium phosphate Powder (PHOS-NaK) 1 Packet(s) Oral three times a day with meals  remdesivir  IVPB 100 milliGRAM(s) IV Intermittent every 24 hours  remdesivir  IVPB   IV Intermittent   timolol 0.5% Solution 1 Drop(s) Both EYES every 24 hours    MEDICATIONS  (PRN):  acetaminophen   Tablet .. 650 milliGRAM(s) Oral every 6 hours PRN Temp greater or equal to 38C (100.4F)  benzonatate 100 milliGRAM(s) Oral three times a day PRN Cough      Allergies    No Known Allergies    Intolerances        LABS:                        11.4   11.89 )-----------( 267      ( 02 Feb 2021 06:36 )             37.8     02-02    144  |  104  |  20  ----------------------------<  174<H>  4.2   |  29  |  0.89    Ca    8.3<L>      02 Feb 2021 06:36  Phos  3.6     02-02  Mg     2.4     02-02    TPro  6.8  /  Alb  3.2<L>  /  TBili  0.4  /  DBili  x   /  AST  36  /  ALT  29  /  AlkPhos  64  02-02    PT/INR - ( 01 Feb 2021 08:15 )   PT: 12.8 sec;   INR: 1.07          PTT - ( 01 Feb 2021 08:15 )  PTT:30.1 sec      RADIOLOGY & ADDITIONAL TESTS:  Reviewed ****************** TRANSFER NOTE 4U2 TO COVID Tele ******************    HOSPITAL COURSE:INTERVAL   Pt is a 50M with glaucoma who presented to [facility] 1/31 for evaluation of cough, shortness of breath and pleuritic chest discomfort over the last 10 days PTA, was admitted for acute hypoxic respiratory failure 2/2 [facility] and transferred to [facility] 2/1 for further management. Pt received first dose of remdesivir and dexamethasone 2/1, was satting 96% on 4L NC, 86% on RA ambulating. On 2/2, pt's had increasing O2 requirements 86% RA at rest, 83% ambulating, dDimer had gone up from 538 -> 5000+, and pt had ongoing inspiratory chest pain. He was sent for CTA which revealed multiple PEs with evidence of R heart strain. He was started on heparin drip, Pulm and ICU consulted, and pt deemed appropriate for transfer to [facility] telemetry.    HPI/OVERNIGHT EVENTS:  O/N: pt. requesting cough suppressant, ordered for tessalon perle PRN   This morning: Patient was seen and examined at bedside. Pain in chest when breathes deeply. Some R calf pain. Still some SOB with exertion. ROS is otherwise negative    VITAL SIGNS:  T(F): 99.4 (02-02-21 @ 06:04)  HR: 74 (02-02-21 @ 06:04)  BP: 124/69 (02-02-21 @ 06:04)  RR: 20 (02-02-21 @ 06:04)  SpO2: 94% (02-02-21 @ 06:04)  Wt(kg): --    PHYSICAL EXAM:    Constitutional: WDWN, NAD, lying comfortably in bed  HEENT: EOMI   Respiratory: CTA b/l  Cardiovascular: RRR, normal S1S2, no M/R/G appreciated  Gastrointestinal: abd soft, nontender, nondistended   Extremities: legs warm, well perfused   Neurological: AAOx3   Skin: moist, diaphoretic    MEDICATIONS  (STANDING):  dexAMETHasone  Injectable 6 milliGRAM(s) IV Push every 24 hours  enoxaparin Injectable 40 milliGRAM(s) SubCutaneous every 24 hours  latanoprost 0.005% Ophthalmic Solution 1 Drop(s) Both EYES at bedtime  potassium phosphate / sodium phosphate Powder (PHOS-NaK) 1 Packet(s) Oral three times a day with meals  remdesivir  IVPB 100 milliGRAM(s) IV Intermittent every 24 hours  remdesivir  IVPB   IV Intermittent   timolol 0.5% Solution 1 Drop(s) Both EYES every 24 hours    MEDICATIONS  (PRN):  acetaminophen   Tablet .. 650 milliGRAM(s) Oral every 6 hours PRN Temp greater or equal to 38C (100.4F)  benzonatate 100 milliGRAM(s) Oral three times a day PRN Cough      Allergies    No Known Allergies    Intolerances        LABS:                        11.4   11.89 )-----------( 267      ( 02 Feb 2021 06:36 )             37.8     02-02    144  |  104  |  20  ----------------------------<  174<H>  4.2   |  29  |  0.89    Ca    8.3<L>      02 Feb 2021 06:36  Phos  3.6     02-02  Mg     2.4     02-02    TPro  6.8  /  Alb  3.2<L>  /  TBili  0.4  /  DBili  x   /  AST  36  /  ALT  29  /  AlkPhos  64  02-02    PT/INR - ( 01 Feb 2021 08:15 )   PT: 12.8 sec;   INR: 1.07          PTT - ( 01 Feb 2021 08:15 )  PTT:30.1 sec      RADIOLOGY & ADDITIONAL TESTS:  Reviewed

## 2021-02-02 NOTE — PROGRESS NOTE ADULT - PROBLEM SELECTOR PLAN 4
Likely ACD, no signs of active/overt bleed    #Leukocytosis.  WBC  5  ->11.89 (2/2). Suspect 2/2 decadron. Cell diff similar to yest.   -monitor CBC with diff and signs of worsening infx

## 2021-02-02 NOTE — CONSULT NOTE ADULT - SUBJECTIVE AND OBJECTIVE BOX
PERT CONSULT:    PULMONARY SERVICE INITIAL CONSULT NOTE    HPI:  49 y/o man with a PMH of Glaucoma and no underlying hx of lung disease. He is a never smoker. He presented with cough, sob, and chest pain. He was found to be COVID + and hypoxemic on admission and started on decadron and remdesivir. His D-Dimer was grossly elevated which prompted a CTA chest which demonstrated bilateral pulmonary embolic with evidence of right heart strain on CT. The PERT team was called to evaluate the patient for this finding. The pt is currently hemodynamically stable, not tachycardic, and saturating well on 3 L NC. The CT scan shows a large clot burden with clots bilaterally starting in the main pulmonary arteries and going distally. The pt denies any person hx of CA but notes his father had renal cell cancer and his brother had glioblastoma. 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 The pt denies any acute complaints of chest pain, palpitations. He denies dyspnea at rest but does not a dry cough.       REVIEW OF SYSTEMS:  A 12 point ROS was reviewed with the patient and was negative except for what is mentioned above.     Allergy and Immunologic: No hives or eczema    PAST MEDICAL & SURGICAL HISTORY:  Glaucoma        FAMILY HISTORY:  No pertinent family history in first degree relatives        SOCIAL HISTORY:  Smoking Status: [ ] Current, [ ] Former, [X ] Never  Pack Years:    MEDICATIONS:  Pulmonary:  benzonatate 100 milliGRAM(s) Oral three times a day PRN    Antimicrobials:  remdesivir  IVPB 100 milliGRAM(s) IV Intermittent every 24 hours  remdesivir  IVPB   IV Intermittent     Anticoagulants:  heparin  Infusion 1800 Unit(s)/Hr IV Continuous <Continuous>    Onc:    GI/:    Endocrine:  dexAMETHasone  Injectable 6 milliGRAM(s) IV Push every 24 hours    Cardiac:    Other Medications:  acetaminophen   Tablet .. 650 milliGRAM(s) Oral every 6 hours PRN  latanoprost 0.005% Ophthalmic Solution 1 Drop(s) Both EYES at bedtime  timolol 0.5% Solution 1 Drop(s) Both EYES every 24 hours      Allergies    No Known Allergies    Intolerances        Vital Signs Last 24 Hrs  T(C): 37.1 (02 Feb 2021 09:01), Max: 37.4 (02 Feb 2021 06:04)  T(F): 98.7 (02 Feb 2021 09:01), Max: 99.4 (02 Feb 2021 06:04)  HR: 77 (02 Feb 2021 09:01) (74 - 84)  BP: 127/70 (02 Feb 2021 09:01) (124/69 - 127/70)  BP(mean): --  RR: 18 (02 Feb 2021 15:46) (18 - 20)  SpO2: 94% (02 Feb 2021 15:46) (83% - 95%)        PHYSICAL EXAM:  Constitutional: WDWN  Head: NC/AT  EENT: PERRL, anicteric sclera; oropharynx clear, MMM  Neck: supple, no appreciable JVD  Respiratory: CTA B/L; no W/R/R  Cardiovascular: +S1/S2, RRR  Gastrointestinal: soft, NT/ND; +BSx4  Extremities: WWP; no edema, clubbing or cyanosis  Vascular: 2+ radial, DP/PT pulses B/L  Neurological: AAOx3; no focal deficits    LABS:      CBC Full  -  ( 02 Feb 2021 06:36 )  WBC Count : 11.89 K/uL  RBC Count : 4.56 M/uL  Hemoglobin : 11.4 g/dL  Hematocrit : 37.8 %  Platelet Count - Automated : 267 K/uL  Mean Cell Volume : 82.9 fl  Mean Cell Hemoglobin : 25.0 pg  Mean Cell Hemoglobin Concentration : 30.2 gm/dL  Auto Neutrophil # : 10.03 K/uL  Auto Lymphocyte # : 0.89 K/uL  Auto Monocyte # : 0.90 K/uL  Auto Eosinophil # : 0.00 K/uL  Auto Basophil # : 0.01 K/uL  Auto Neutrophil % : 84.3 %  Auto Lymphocyte % : 7.5 %  Auto Monocyte % : 7.6 %  Auto Eosinophil % : 0.0 %  Auto Basophil % : 0.1 %    02-02    144  |  104  |  20  ----------------------------<  174<H>  4.2   |  29  |  0.89    Ca    8.3<L>      02 Feb 2021 06:36  Phos  3.6     02-02  Mg     2.4     02-02    TPro  6.8  /  Alb  3.2<L>  /  TBili  0.4  /  DBili  x   /  AST  36  /  ALT  29  /  AlkPhos  64  02-02    PT/INR - ( 01 Feb 2021 08:15 )   PT: 12.8 sec;   INR: 1.07          PTT - ( 01 Feb 2021 08:15 )  PTT:30.1 sec                  RADIOLOGY & ADDITIONAL STUDIES:  CTA chest

## 2021-02-02 NOTE — PROGRESS NOTE ADULT - SUBJECTIVE AND OBJECTIVE BOX
TRANSFER ACCEPTANCE NOTE: 2WO TELE COVID TO [address] [place] [address]    HOSPITAL COURSE:  Pt is a 50M with glaucoma who presented to [facility] 1/31 for evaluation of cough, shortness of breath and pleuritic chest discomfort over the last 10 days PTA, was admitted for acute hypoxic respiratory failure 2/2 [facility] and transferred to [facility] 2/1 for further management. Pt received first dose of remdesivir and dexamethasone 2/1, was satting 96% on 4L NC, 86% on RA ambulating. On 2/2, pt's had increasing O2 requirements 86% RA at rest, 83% ambulating, dDimer had gone up from 538 -> 5000+, and pt had ongoing inspiratory chest pain. He was sent for CTA which revealed multiple PEs with evidence of R heart strain. He was started on heparin drip, Pulm and ICU consulted, and pt deemed appropriate for transfer to [facility] telemetry.      SUBJECTIVE / INTERVAL HPI: Patient seen and examined at bedside.     VITAL SIGNS:  Vital Signs Last 24 Hrs  T(C): 37.1 (02 Feb 2021 09:01), Max: 37.4 (02 Feb 2021 06:04)  T(F): 98.7 (02 Feb 2021 09:01), Max: 99.4 (02 Feb 2021 06:04)  HR: 77 (02 Feb 2021 09:01) (74 - 84)  BP: 127/70 (02 Feb 2021 09:01) (124/69 - 127/70)  BP(mean): --  RR: 18 (02 Feb 2021 15:46) (18 - 20)  SpO2: 94% (02 Feb 2021 15:46) (83% - 95%)    PHYSICAL EXAM:    General: Well developed, well nourished, no acute distress  HEENT: NC/AT; PERRL, anicteric sclera; MMM  Neck: supple, no JVD  Cardiovascular: +S1/S2, RRR; no murmurs, rubs, gallops  Respiratory: CTAB; no wheezes, rales, or rhonchi  Gastrointestinal: soft, NT/ND, no masses palpated; no rebound tenderness or guarding; +BS  Extremities: warm and well-perfused; no edema, clubbing or cyanosis  Vascular: 2+ radial, DP pulses B/L  Neurological: AAOx3; no focal deficits; MARYJANE    MEDICATIONS:  MEDICATIONS  (STANDING):  dexAMETHasone  Injectable 6 milliGRAM(s) IV Push every 24 hours  heparin  Infusion 1800 Unit(s)/Hr (18 mL/Hr) IV Continuous <Continuous>  latanoprost 0.005% Ophthalmic Solution 1 Drop(s) Both EYES at bedtime  remdesivir  IVPB 100 milliGRAM(s) IV Intermittent every 24 hours  remdesivir  IVPB   IV Intermittent   timolol 0.5% Solution 1 Drop(s) Both EYES every 24 hours    MEDICATIONS  (PRN):  acetaminophen   Tablet .. 650 milliGRAM(s) Oral every 6 hours PRN Temp greater or equal to 38C (100.4F)  benzonatate 100 milliGRAM(s) Oral three times a day PRN Cough      ALLERGIES:  Allergies    No Known Allergies    Intolerances        LABS:                        11.4   11.89 )-----------( 267      ( 02 Feb 2021 06:36 )             37.8     02-02    144  |  104  |  20  ----------------------------<  174<H>  4.2   |  29  |  0.89    Ca    8.3<L>      02 Feb 2021 06:36  Phos  3.6     02-02  Mg     2.4     02-02    TPro  6.8  /  Alb  3.2<L>  /  TBili  0.4  /  DBili  x   /  AST  36  /  ALT  29  /  AlkPhos  64  02-02    PT/INR - ( 02 Feb 2021 06:36 )   PT: 13.7 sec;   INR: 1.15          PTT - ( 02 Feb 2021 06:36 )  PTT:30.4 sec    CAPILLARY BLOOD GLUCOSE          RADIOLOGY & ADDITIONAL TESTS: Reviewed.    PLAN:

## 2021-02-02 NOTE — PROGRESS NOTE ADULT - PROBLEM SELECTOR PLAN 4
Familial. Multiple family members who have glaucoma. Takes timolol in AM and lumigan in PM at home.  -c/w home timolol and lumigan eye drops Likely ACD, no signs of active/overt bleed    #Leukocytosis.  WBC  5  ->11.89 (2/2). Suspect 2/2 decadron. Cell diff similar to yest.   -monitor CBC with diff and signs of worsening infx

## 2021-02-02 NOTE — PHYSICAL THERAPY INITIAL EVALUATION ADULT - PERTINENT HX OF CURRENT PROBLEM, REHAB EVAL
Pt. is a 50 y.o. male admitted with 10 days of SOB, cough, pleuritic chest pain, was admitted for further management of acute hypoxic respiratory failure due to COVID.

## 2021-02-02 NOTE — PROGRESS NOTE ADULT - PROBLEM SELECTOR PLAN 5
Familial. Multiple family members who have glaucoma. Takes timolol in AM and lumigan in PM at home.  -c/w home timolol and lumigan eye drops

## 2021-02-02 NOTE — PROGRESS NOTE ADULT - PROBLEM SELECTOR PLAN 6
F: cautious fluid repletion in covid  E: Replete PRN K<4, Mg<2  N: Regular  DVT: Lovenox  GI: none  CODE: Full  Dispo: COVID RMF

## 2021-02-02 NOTE — CONSULT NOTE ADULT - ASSESSMENT
50M with glaucoma presents for evaluation of cough, shortness of breath and pleuritic chest discomfort over the last 10 days PTA, admitted for acute hypoxic respiratory failure 2/2 COVID PNA.   C/O pleuritic chest pain when coughing, with rising D dimer levels- 538 --> 5362 --> 6853  CTPE showing: evidence of emboli and right heart strain- unofficial read.     #PE - with evidence of right heart strain on CT - pending official read   -Start heparin ggt as per heparin nomogram   -Monitor PTT [id number]   -ECHO tomorrow   -F/u official read  -Pulmonology consult in AM     #Acute hypoxic respiratory failure 2/2 to COVID  -Continue remdesivir and decadron as per COVID protocol  -Monitor O2 saturation, wean O2 as tolerated  -Trend COVID inflammatory markers daily   -Airborne precaution   -Tylenol PRN for fever     Disposition: 2 [facility]fe [facility] Telemetry     Plan discussed with Dr. Julian  50M with glaucoma presents for evaluation of cough, shortness of breath and pleuritic chest discomfort over the last 10 days PTA, admitted for acute hypoxic respiratory failure 2/2 COVID PNA.   C/O pleuritic chest pain when coughing, with rising D dimer levels- 538 --> 5362 --> 6853  CTPE showing: evidence of b/l pulmonary emboli and right heart strain- unofficial read.     ICU consulted for submassive PE     Wells score- 3 points = moderate risk group 16.2% chance of PE  PESI score= 79 = class II, low risk 1.7-3.5%   PERC score= 1    #PE - with evidence of right heart strain on CT - pending official read   -Start heparin ggt as per heparin nomogram   -Monitor PTT [id number]   -ECHO tomorrow   -F/u official read  -F/U Pulm/PERT team recs     #Acute hypoxic respiratory failure 2/2 to COVID  -Continue remdesivir and decadron as per COVID protocol  -Monitor O2 saturation, wean O2 as tolerated  -Trend COVID inflammatory markers daily   -Airborne precaution   -Tylenol PRN for fever     Disposition: 2 [facility] COVID Telemetry     Plan discussed with Dr. Julian

## 2021-02-02 NOTE — PROGRESS NOTE ADULT - SUBJECTIVE AND OBJECTIVE BOX
Transfer Acceptance Note - [facility] (COVID RMF) to [address] [place] [address] ([facility] Telemetry)    HOSPITAL COURSE: Pt is a 50M with glaucoma who presented to [facility] 1/31 for evaluation of cough, shortness of breath and pleuritic chest discomfort over the last 10 days PTA, was admitted for acute hypoxic respiratory failure 2/2 [facility] and transferred to [facility] 2/1 for further management. Pt received first dose of remdesivir and dexamethasone 2/1, was satting 96% on 4L NC, 86% on RA ambulating. On 2/2, pt's had increasing O2 requirements 86% RA at rest, 83% ambulating, dDimer had gone up from 538 -> 5000+, and pt had ongoing inspiratory chest pain. He was sent for CTA which revealed multiple PEs with evidence of R heart strain. He was started on heparin drip, Pulm and ICU consulted, and pt deemed appropriate for transfer to COVID telemetry.    SUBJECTIVE/OVERNIGHT EVENTS: Pt transferred in late evening.    VITAL SIGNS:  Vital Signs Last 24 Hrs  T(C): 37.1 (02 Feb 2021 09:01), Max: 37.4 (02 Feb 2021 06:04)  T(F): 98.7 (02 Feb 2021 09:01), Max: 99.4 (02 Feb 2021 06:04)  HR: 77 (02 Feb 2021 09:01) (74 - 84)  BP: 127/70 (02 Feb 2021 09:01) (124/69 - 127/70)  BP(mean): --  RR: 18 (02 Feb 2021 15:46) (18 - 20)  SpO2: 94% (02 Feb 2021 15:46) (83% - 95%)    PHYSICAL EXAM:  Constitutional: WDWN, NAD, lying comfortably in bed  HEENT: EOMI   Respiratory: CTA b/l  Cardiovascular: RRR, normal S1S2, no M/R/G appreciated  Gastrointestinal: abd soft, nontender, nondistended   Extremities: legs warm, well perfused   Neurological: AAOx3   Skin: moist, diaphoretic    MEDICATIONS:  MEDICATIONS  (STANDING):  dexAMETHasone  Injectable 6 milliGRAM(s) IV Push every 24 hours  heparin  Infusion 1800 Unit(s)/Hr (18 mL/Hr) IV Continuous <Continuous>  latanoprost 0.005% Ophthalmic Solution 1 Drop(s) Both EYES at bedtime  remdesivir  IVPB 100 milliGRAM(s) IV Intermittent every 24 hours  remdesivir  IVPB   IV Intermittent   timolol 0.5% Solution 1 Drop(s) Both EYES every 24 hours    MEDICATIONS  (PRN):  acetaminophen   Tablet .. 650 milliGRAM(s) Oral every 6 hours PRN Temp greater or equal to 38C (100.4F)  benzonatate 100 milliGRAM(s) Oral three times a day PRN Cough      ALLERGIES:  Allergies    No Known Allergies    Intolerances        LABS:                        11.4   11.89 )-----------( 267      ( 02 Feb 2021 06:36 )             37.8     02-02    144  |  104  |  20  ----------------------------<  174<H>  4.2   |  29  |  0.89    Ca    8.3<L>      02 Feb 2021 06:36  Phos  3.6     02-02  Mg     2.4     02-02    TPro  6.8  /  Alb  3.2<L>  /  TBili  0.4  /  DBili  x   /  AST  36  /  ALT  29  /  AlkPhos  64  02-02    PT/INR - ( 02 Feb 2021 06:36 )   PT: 13.7 sec;   INR: 1.15          PTT - ( 02 Feb 2021 06:36 )  PTT:30.4 sec    RADIOLOGY & ADDITIONAL TESTS: Reviewed.

## 2021-02-02 NOTE — PROGRESS NOTE ADULT - PROBLEM SELECTOR PLAN 3
Likely ACD, no signs of active/overt bleed    #Leukocytosis.  WBC  5  ->11.89 (2/2). Suspect 2/2 decadron. Cell diff similar to yest.   -monitor CBC with diff and signs of worsening infx As above

## 2021-02-02 NOTE — CONSULT NOTE ADULT - SUBJECTIVE AND OBJECTIVE BOX
Patient is a 50y old  Male who presents with a chief complaint of SOB (02 Feb 2021 08:33)       HPI:  50M no significant PMH presents for evaluation of cough, shortness of breath and pleuritic chest discomfort over the last 10 days. Patient reports that he tested positive on 1/21 and has been sheltering in place for the last 10 days. He was evaluated by his primary care physician and was sent in for antibody infusion but unfortunately did not meet the criteria. He also reports that he has had a decreased appetite as well as fatigue, fevers and dyspnea on exertion. Denies orthopnea, PND, weakness, numbness, focal deficits. Endorses initially having diarrhea that has now improved.    In the ED VS T 99.3 HR 79 /86 R 20 91% on RA   Labs: WBC 7 | Hg/Hct 13/42 | Plt 250 | Na 137 K 3.5 |  AST/ALT ALP 70/40 64 | Ferritin 314 D-dimer 538 CRP 11 | Procalcitonin 0.12 |  Imaging: CXR < from: Xray Chest 1 View- PORTABLE-Urgent (01.31.21 @ 14:04)  Hazy airspace opacity peripheral aspect of the left lower lobe, concerning for viral pneumonia such as COVID-19    ED Course: Dexamethasone 6mg, 1L LR, and Tylenol 650mg.        (01 Feb 2021 02:58)      PAST MEDICAL & SURGICAL HISTORY:  Glaucoma        MEDICATIONS  (STANDING):  dexAMETHasone  Injectable 6 milliGRAM(s) IV Push every 24 hours  enoxaparin Injectable 40 milliGRAM(s) SubCutaneous every 24 hours  latanoprost 0.005% Ophthalmic Solution 1 Drop(s) Both EYES at bedtime  remdesivir  IVPB 100 milliGRAM(s) IV Intermittent every 24 hours  remdesivir  IVPB   IV Intermittent   timolol 0.5% Solution 1 Drop(s) Both EYES every 24 hours    MEDICATIONS  (PRN):  acetaminophen   Tablet .. 650 milliGRAM(s) Oral every 6 hours PRN Temp greater or equal to 38C (100.4F)  benzonatate 100 milliGRAM(s) Oral three times a day PRN Cough      FAMILY HISTORY:  No pertinent family history in first degree relatives        CBC Full  -  ( 02 Feb 2021 06:36 )  WBC Count : 11.89 K/uL  RBC Count : 4.56 M/uL  Hemoglobin : 11.4 g/dL  Hematocrit : 37.8 %  Platelet Count - Automated : 267 K/uL  Mean Cell Volume : 82.9 fl  Mean Cell Hemoglobin : 25.0 pg  Mean Cell Hemoglobin Concentration : 30.2 gm/dL  Auto Neutrophil # : 10.03 K/uL  Auto Lymphocyte # : 0.89 K/uL  Auto Monocyte # : 0.90 K/uL  Auto Eosinophil # : 0.00 K/uL  Auto Basophil # : 0.01 K/uL  Auto Neutrophil % : 84.3 %  Auto Lymphocyte % : 7.5 %  Auto Monocyte % : 7.6 %  Auto Eosinophil % : 0.0 %  Auto Basophil % : 0.1 %      02-02    144  |  104  |  20  ----------------------------<  174<H>  4.2   |  29  |  0.89    Ca    8.3<L>      02 Feb 2021 06:36  Phos  3.6     02-02  Mg     2.4     02-02    TPro  6.8  /  Alb  3.2<L>  /  TBili  0.4  /  DBili  x   /  AST  36  /  ALT  29  /  AlkPhos  64  02-02            Radiology:    < from: CT Chest No Cont (01.31.21 @ 22:46) >  EXAM:  CT CHEST                            PROCEDURE DATE:  01/31/2021            INTERPRETATION:  CLINICAL INFORMATION: COVID-19 pneumonia.    COMPARISON: Correlate with same-day chest x-ray.    PROCEDURE:  CT of the Chest was performed without intravenous contrast.  Sagittal and coronal reformats were performed.    FINDINGS:    LUNGS AND AIRWAYS: No endobronchial lesions. There are groundglass and patchy opacities with a peripheral and lower lung distribution in all 5 lobes, most pronounced in the bilateral lower lobes.    PLEURA: No pleural effusion.    MEDIASTINUM AND DEE: No lymphadenopathy.    VESSELS: Within normal limits.    HEART: Heart size is normal. No pericardial effusion. Coronary artery calcification within the left anteriordescending artery.    CHEST WALL AND LOWER NECK: Within normal limits.    VISUALIZED UPPER ABDOMEN: Small hiatal hernia.    BONES: Within normal limits.    IMPRESSION:    Patchy and groundglass opacities throughout both lungs corresponding to the known COVID-19 pneumonia.    Coronary artery atherosclerotic disease.              Vital Signs Last 24 Hrs  T(C): 37.1 (02 Feb 2021 09:01), Max: 37.4 (02 Feb 2021 06:04)  T(F): 98.7 (02 Feb 2021 09:01), Max: 99.4 (02 Feb 2021 06:04)  HR: 77 (02 Feb 2021 09:01) (74 - 84)  BP: 127/70 (02 Feb 2021 09:01) (124/69 - 127/70)  BP(mean): --  RR: 20 (02 Feb 2021 09:12) (18 - 20)  SpO2: 84% (02 Feb 2021 09:12) (83% - 95%)    REVIEW OF SYSTEMS:    CONSTITUTIONAL: No fever, weight loss, or fatigue  EYES: No eye pain, visual disturbances, or discharge  ENMT:  No difficulty hearing, tinnitus, vertigo; No sinus or throat pain  NECK: No pain or stiffness  BREASTS: No pain, masses, or nipple discharge  RESPIRATORY: shortness of breath  CARDIOVASCULAR: No chest pain, palpitations, dizziness, or leg swelling  GASTROINTESTINAL: No abdominal or epigastric pain. No nausea, vomiting, or hematemesis; No diarrhea or constipation. No melena or hematochezia.  GENITOURINARY: No dysuria, frequency, hematuria, or incontinence  NEUROLOGICAL: No headaches, memory loss, loss of strength, numbness, or tremors  SKIN: No itching, burning, rashes, or lesions   LYMPH NODES: No enlarged glands  ENDOCRINE: No heat or cold intolerance; No hair loss  MUSCULOSKELETAL: No joint pain or swelling; No muscle, back, or extremity pain  PSYCHIATRIC: No depression, anxiety, mood swings, or difficulty sleeping  HEME/LYMPH: No easy bruising, or bleeding gums  ALLERGY AND IMMUNOLOGIC: No hives or eczema  VASCULAR: no swelling, erythema,   : no dysuria, hematuria, frequency        Physical Exam:  on COVID isolation, in accordance with current standards limiting patient contact, please refer to exam performed on 2/2/2021    Constitutional: WDWN, NAD, lying comfortably in bed  HEENT: EOMI   Respiratory: CTA b/l  Cardiovascular: RRR, normal S1S2, no M/R/G appreciated  Gastrointestinal: abd soft, nontender, nondistended   Extremities: legs warm, well perfused   Neurological: AAOx3   Skin: moist, diaphoretic      PM&R Impression:    1) PNA/ COVID 19  2) no focal weakness    Plan: cleared to begin rehab    1) Physical therapy focusing on therapeutic exercises, bed mobility/transfer out of bed evaluation, progressive ambulation with assistive devices prn.    2) Anticipated Disposition Plan/Recs:   pending functional progress

## 2021-02-02 NOTE — PROGRESS NOTE ADULT - PROBLEM SELECTOR PLAN 2
91% on RA started on Remdesivir and Dexamethasone 2/1 per patient. CXR consistent with COVID. DDimer increased x 10 - dopplers 2/1 negative,, consider CTPE if worsening hypoxia or signs of PE  - Remdesivir x5 days (2/1-2/5)  - Dexamethasone x10 days (2/1-2/10)  - Supplemental O2  - incentive spirometer  - Lovenox DVT ppx

## 2021-02-02 NOTE — PROGRESS NOTE ADULT - PROBLEM SELECTOR PLAN 1
New PEs diagnosed on CTA 2/2/21 with evid. of R heart strain on CT, offical read to follow. Pt HDUS at this time. Trops, ckmb negative, CK mildly elevated. Started on heparin gtt 2/2/21 PM.  -repeat PTTs and dose heparin per nomogram  -f/u echo for further evaluation of heart fxn, strain  -f/u BNP  -no emergent intervention at this time per pulm, but wld ambulate in AM to assess his symptoms and in comb. with echo determine if may benefit from procedure  -f/u pulm/PERT recs

## 2021-02-02 NOTE — PROGRESS NOTE ADULT - PROBLEM SELECTOR PLAN 2
As above 91% on RA started on Remdesivir and Dexamethasone 2/1 per patient. CXR consistent with COVID. DDimer increased x 10 - dopplers 2/1 negative,, consider CTPE if worsening hypoxia or signs of PE  - Remdesivir x5 days (2/1-2/5)  - Dexamethasone x10 days (2/1-2/10)  - Supplemental O2  - incentive spirometer  - Lovenox DVT ppx

## 2021-02-02 NOTE — CONSULT NOTE ADULT - PROBLEM SELECTOR RECOMMENDATION 9
-Multifactorial in the setting of COVID PNA and now b/l Pulmonary emboli  -In terms of risk factors the pt is a never smoker, and likely this was precipitated by his COVID infection.   -PESI score 79, which predicts a Class II risk (1.7-3.5% 30 day mortality)    Recommend:  -Given Degree of clot burden would recommend telemetry monitoring and would move pt to 2WO.   -Agree w/ heparin gtt  -Please check troponin and BNP and order echo   -No emergent invasive measurement warranted at this time, tomorrow would have pt ambulate to assess degree of symptoms on exertion and correlate this with echo findings to determine if pt would benefit from an advanced procedure.  -Case discussed with Dr. Julian

## 2021-02-02 NOTE — PHYSICAL THERAPY INITIAL EVALUATION ADULT - PREDICTED DURATION OF THERAPY (DAYS/WKS), PT EVAL
Pt. would benefit from further gait, endurance, balance, stairs  training to facilitate return to prior level of function.

## 2021-02-03 LAB
ALBUMIN SERPL ELPH-MCNC: 3.2 G/DL — LOW (ref 3.3–5)
ALP SERPL-CCNC: 57 U/L — SIGNIFICANT CHANGE UP (ref 40–120)
ALT FLD-CCNC: 31 U/L — SIGNIFICANT CHANGE UP (ref 10–45)
ANION GAP SERPL CALC-SCNC: 9 MMOL/L — SIGNIFICANT CHANGE UP (ref 5–17)
APTT BLD: 125.4 SEC — CRITICAL HIGH (ref 27.5–35.5)
APTT BLD: 130.3 SEC — CRITICAL HIGH (ref 27.5–35.5)
APTT BLD: 150.7 SEC — CRITICAL HIGH (ref 27.5–35.5)
AST SERPL-CCNC: 30 U/L — SIGNIFICANT CHANGE UP (ref 10–40)
BASOPHILS # BLD AUTO: 0 K/UL — SIGNIFICANT CHANGE UP (ref 0–0.2)
BASOPHILS NFR BLD AUTO: 0 % — SIGNIFICANT CHANGE UP (ref 0–2)
BILIRUB SERPL-MCNC: 0.3 MG/DL — SIGNIFICANT CHANGE UP (ref 0.2–1.2)
BLD GP AB SCN SERPL QL: NEGATIVE — SIGNIFICANT CHANGE UP
BUN SERPL-MCNC: 21 MG/DL — SIGNIFICANT CHANGE UP (ref 7–23)
CALCIUM SERPL-MCNC: 8.6 MG/DL — SIGNIFICANT CHANGE UP (ref 8.4–10.5)
CHLORIDE SERPL-SCNC: 102 MMOL/L — SIGNIFICANT CHANGE UP (ref 96–108)
CK MB CFR SERPL CALC: 1.2 NG/ML — SIGNIFICANT CHANGE UP (ref 0–6.7)
CK SERPL-CCNC: 150 U/L — SIGNIFICANT CHANGE UP (ref 30–200)
CO2 SERPL-SCNC: 32 MMOL/L — HIGH (ref 22–31)
CREAT SERPL-MCNC: 0.87 MG/DL — SIGNIFICANT CHANGE UP (ref 0.5–1.3)
CRP SERPL-MCNC: 2.43 MG/DL — HIGH (ref 0–0.4)
D DIMER BLD IA.RAPID-MCNC: 7664 NG/ML DDU — HIGH
EOSINOPHIL # BLD AUTO: 0 K/UL — SIGNIFICANT CHANGE UP (ref 0–0.5)
EOSINOPHIL NFR BLD AUTO: 0 % — SIGNIFICANT CHANGE UP (ref 0–6)
FERRITIN SERPL-MCNC: 230 NG/ML — SIGNIFICANT CHANGE UP (ref 30–400)
GLUCOSE BLDC GLUCOMTR-MCNC: 246 MG/DL — HIGH (ref 70–99)
GLUCOSE SERPL-MCNC: 191 MG/DL — HIGH (ref 70–99)
HCT VFR BLD CALC: 35.7 % — LOW (ref 39–50)
HGB BLD-MCNC: 10.9 G/DL — LOW (ref 13–17)
IMM GRANULOCYTES NFR BLD AUTO: 0.5 % — SIGNIFICANT CHANGE UP (ref 0–1.5)
LYMPHOCYTES # BLD AUTO: 0.72 K/UL — LOW (ref 1–3.3)
LYMPHOCYTES # BLD AUTO: 6.6 % — LOW (ref 13–44)
MCHC RBC-ENTMCNC: 25.7 PG — LOW (ref 27–34)
MCHC RBC-ENTMCNC: 30.5 GM/DL — LOW (ref 32–36)
MCV RBC AUTO: 84.2 FL — SIGNIFICANT CHANGE UP (ref 80–100)
MONOCYTES # BLD AUTO: 0.83 K/UL — SIGNIFICANT CHANGE UP (ref 0–0.9)
MONOCYTES NFR BLD AUTO: 7.6 % — SIGNIFICANT CHANGE UP (ref 2–14)
NEUTROPHILS # BLD AUTO: 9.33 K/UL — HIGH (ref 1.8–7.4)
NEUTROPHILS NFR BLD AUTO: 85.3 % — HIGH (ref 43–77)
NRBC # BLD: 0 /100 WBCS — SIGNIFICANT CHANGE UP (ref 0–0)
PLATELET # BLD AUTO: 256 K/UL — SIGNIFICANT CHANGE UP (ref 150–400)
POTASSIUM SERPL-MCNC: 4.2 MMOL/L — SIGNIFICANT CHANGE UP (ref 3.5–5.3)
POTASSIUM SERPL-SCNC: 4.2 MMOL/L — SIGNIFICANT CHANGE UP (ref 3.5–5.3)
PROT SERPL-MCNC: 6.2 G/DL — SIGNIFICANT CHANGE UP (ref 6–8.3)
RBC # BLD: 4.24 M/UL — SIGNIFICANT CHANGE UP (ref 4.2–5.8)
RBC # FLD: 13.2 % — SIGNIFICANT CHANGE UP (ref 10.3–14.5)
RH IG SCN BLD-IMP: POSITIVE — SIGNIFICANT CHANGE UP
SODIUM SERPL-SCNC: 143 MMOL/L — SIGNIFICANT CHANGE UP (ref 135–145)
TROPONIN T SERPL-MCNC: <0.01 NG/ML — SIGNIFICANT CHANGE UP (ref 0–0.01)
WBC # BLD: 10.93 K/UL — HIGH (ref 3.8–10.5)
WBC # FLD AUTO: 10.93 K/UL — HIGH (ref 3.8–10.5)

## 2021-02-03 PROCEDURE — 99233 SBSQ HOSP IP/OBS HIGH 50: CPT | Mod: GC

## 2021-02-03 PROCEDURE — 93308 TTE F-UP OR LMTD: CPT | Mod: 26

## 2021-02-03 RX ORDER — APIXABAN 2.5 MG/1
10 TABLET, FILM COATED ORAL EVERY 12 HOURS
Refills: 0 | Status: DISCONTINUED | OUTPATIENT
Start: 2021-02-03 | End: 2021-02-05

## 2021-02-03 RX ORDER — APIXABAN 2.5 MG/1
5 TABLET, FILM COATED ORAL EVERY 12 HOURS
Refills: 0 | Status: CANCELLED | OUTPATIENT
Start: 2021-02-17 | End: 2021-02-05

## 2021-02-03 RX ORDER — HEPARIN SODIUM 5000 [USP'U]/ML
1300 INJECTION INTRAVENOUS; SUBCUTANEOUS
Qty: 25000 | Refills: 0 | Status: DISCONTINUED | OUTPATIENT
Start: 2021-02-03 | End: 2021-02-03

## 2021-02-03 RX ADMIN — APIXABAN 10 MILLIGRAM(S): 2.5 TABLET, FILM COATED ORAL at 18:15

## 2021-02-03 RX ADMIN — Medication 6 MILLIGRAM(S): at 13:00

## 2021-02-03 RX ADMIN — Medication 1 DROP(S): at 05:26

## 2021-02-03 RX ADMIN — Medication 100 MILLIGRAM(S): at 18:14

## 2021-02-03 RX ADMIN — HEPARIN SODIUM 15 UNIT(S)/HR: 5000 INJECTION INTRAVENOUS; SUBCUTANEOUS at 04:35

## 2021-02-03 RX ADMIN — LATANOPROST 1 DROP(S): 0.05 SOLUTION/ DROPS OPHTHALMIC; TOPICAL at 22:16

## 2021-02-03 RX ADMIN — HEPARIN SODIUM 1300 UNIT(S)/HR: 5000 INJECTION INTRAVENOUS; SUBCUTANEOUS at 11:42

## 2021-02-03 RX ADMIN — REMDESIVIR 500 MILLIGRAM(S): 5 INJECTION INTRAVENOUS at 05:30

## 2021-02-03 NOTE — PROGRESS NOTE ADULT - ASSESSMENT
50M with glaucoma presents for evaluation of cough, shortness of breath and pleuritic chest discomfort over the last 10 days PTA, admitted for acute hypoxic respiratory failure 2/2 COVID  50M with glaucoma presents for evaluation of cough, shortness of breath and pleuritic chest discomfort over the last 10 days PTA, admitted for acute hypoxic respiratory failure 2/2 COVID, found to have submassive PE w/ RHS on CT, pending echo.  50M with glaucoma presents for evaluation of cough, shortness of breath and pleuritic chest discomfort over the last 10 days PTA, admitted for acute hypoxic respiratory failure 2/2 COVID, found to have submassive PE w/ RHS on CT, currently on heparin gtt for A/C.

## 2021-02-03 NOTE — PROGRESS NOTE ADULT - PROBLEM SELECTOR PLAN 4
Likely ACD, no signs of active/overt bleed    #Leukocytosis.  WBC  5  ->11.89 (2/2). Suspect 2/2 decadron. Cell diff similar to yest.   -monitor CBC with diff and signs of worsening infx Likely ACD, no signs of active/overt bleed    #Leukocytosis.  WBC  5  ->11.89 (2/2). Suspect 2/2 decadron.   -monitor CBC with diff and signs of worsening infx

## 2021-02-03 NOTE — PROGRESS NOTE ADULT - PROBLEM SELECTOR PLAN 2
91% on RA started on Remdesivir and Dexamethasone 2/1 per patient. CXR consistent with COVID. DDimer increased x 10 - dopplers 2/1 negative,, consider CTPE if worsening hypoxia or signs of PE  - Remdesivir x5 days (2/1-2/5)  - Dexamethasone x10 days (2/1-2/10)  - Supplemental O2  - incentive spirometer  - Lovenox DVT ppx 91% on RA started on Remdesivir and Dexamethasone 2/1 per patient. CXR consistent with COVID. DDimer increased x 10 - dopplers 2/1 negative,, consider CTPE if worsening hypoxia or signs of PE  - Remdesivir x5 days (2/1-2/5)  - Dexamethasone x10 days (2/1-2/10)  - Supplemental O2  - incentive spirometer  -on heparin ggt for DVT ppx- transition to lovenox or DOAC on 2/4

## 2021-02-03 NOTE — PROGRESS NOTE ADULT - SUBJECTIVE AND OBJECTIVE BOX
PULMONARY CONSULT SERVICE FOLLOW-UP NOTE    INTERVAL HPI:  Reviewed chart and overnight events; patient seen and examined at bedside. no acute events overnight. no new complaints.     MEDICATIONS:  Pulmonary:  benzonatate 100 milliGRAM(s) Oral three times a day PRN    Antimicrobials:  remdesivir  IVPB 100 milliGRAM(s) IV Intermittent every 24 hours  remdesivir  IVPB   IV Intermittent     Anticoagulants:  heparin  Infusion 1800 Unit(s)/Hr IV Continuous <Continuous>    Cardiac:      Allergies    No Known Allergies    Intolerances        Vital Signs Last 24 Hrs  T(C): 36.9 (03 Feb 2021 00:34), Max: 37.1 (02 Feb 2021 09:01)  T(F): 98.4 (03 Feb 2021 00:34), Max: 98.7 (02 Feb 2021 09:01)  HR: 81 (03 Feb 2021 05:31) (72 - 81)  BP: 141/76 (03 Feb 2021 05:31) (120/62 - 141/76)  BP(mean): --  RR: 20 (03 Feb 2021 05:31) (18 - 20)  SpO2: 97% (03 Feb 2021 05:31) (83% - 97%)    02-02 @ 07:01  -  02-03 @ 07:00  --------------------------------------------------------  IN: 0 mL / OUT: 400 mL / NET: -400 mL      PHYSICAL EXAM:  Constitutional: WDWN  Head: NC/AT  EENT: PERRL, anicteric sclera; oropharynx clear, MMM  Neck: supple, no appreciable JVD  Respiratory: CTA B/L; no W/R/R  Cardiovascular: +S1/S2, RRR  Gastrointestinal: soft, NT/ND; +BSx4  Extremities: WWP; no edema, clubbing or cyanosis  Vascular: 2+ radial, DP/PT pulses B/L  Neurological: AAOx3; no focal deficits    LABS:      CBC Full  -  ( 03 Feb 2021 07:36 )  WBC Count : 10.93 K/uL  RBC Count : 4.24 M/uL  Hemoglobin : 10.9 g/dL  Hematocrit : 35.7 %  Platelet Count - Automated : 256 K/uL  Mean Cell Volume : 84.2 fl  Mean Cell Hemoglobin : 25.7 pg  Mean Cell Hemoglobin Concentration : 30.5 gm/dL  Auto Neutrophil # : 9.33 K/uL  Auto Lymphocyte # : 0.72 K/uL  Auto Monocyte # : 0.83 K/uL  Auto Eosinophil # : 0.00 K/uL  Auto Basophil # : 0.00 K/uL  Auto Neutrophil % : 85.3 %  Auto Lymphocyte % : 6.6 %  Auto Monocyte % : 7.6 %  Auto Eosinophil % : 0.0 %  Auto Basophil % : 0.0 %    02-03    143  |  102  |  21  ----------------------------<  191<H>  4.2   |  32<H>  |  0.87    Ca    8.6      03 Feb 2021 07:36  Phos  3.6     02-02  Mg     2.4     02-02    TPro  6.2  /  Alb  3.2<L>  /  TBili  0.3  /  DBili  x   /  AST  30  /  ALT  31  /  AlkPhos  57  02-03    PT/INR - ( 02 Feb 2021 06:36 )   PT: 13.7 sec;   INR: 1.15          PTT - ( 03 Feb 2021 02:38 )  PTT:130.3 sec      RADIOLOGY & ADDITIONAL STUDIES:    CT Angio Chest PE Protocol w/ IV Cont IMPRESSION:  Pulmonary emboli in the right pulmonary artery extending into the right middle lobe lateral segmental artery and right lower lobe basilar segmental arteries and left lower lobar artery and associated basilar segmental arteries. There are also findings consistent with right heart strain.    Peripheral groundglass opacity in the lungs bilaterally, consistent with known Covid infection.

## 2021-02-03 NOTE — PROGRESS NOTE ADULT - PROBLEM SELECTOR PLAN 1
- Multifactorial in the setting of COVID PNA and now b/l Pulmonary emboli  -PESI score 79, which predicts a Class II risk (1.7-3.5% 30 day mortality)    Recommend:  - C/w heparin, anticipate transition to DOAC   - Pending echo  - Ambulate today w/ and w/o O2   - Do not anticipate endovascular intervention - Multifactorial in the setting of COVID PNA and now b/l Pulmonary emboli  -PESI score 79, which predicts a Class II risk (1.7-3.5% 30 day mortality)    Recommend:  - transition to eliquis   - Ambulate today w/ and w/o O2   - no evidence of rv failure or strain  - No endovascular intervention  - please arrange for outpatient pulmonology f/u in 1 month from discharge with Dr. Julian  - pulmonary signing off

## 2021-02-03 NOTE — PROGRESS NOTE ADULT - ASSESSMENT
per Internal Medicine    51 yo M with glaucoma presents for evaluation of cough, shortness of breath and pleuritic chest discomfort over the last 10 days PTA, admitted for acute hypoxic respiratory failure 2/2 COVID     Problem/Plan - 1:  ·  Problem: Pulmonary embolism.  Plan: New PEs diagnosed on CTA 2/2/21 with evid. of R heart strain on CT, offical read to follow. Pt HDUS at this time. Trops, ckmb negative, CK mildly elevated. Started on heparin gtt 2/2/21 PM.  -repeat PTTs and dose heparin per nomogram  -f/u echo for further evaluation of heart fxn, strain  -f/u BNP  -no emergent intervention at this time per pulm, but wld ambulate in AM to assess his symptoms and in comb. with echo determine if may benefit from procedure  -f/u pulm/PERT recs.     Problem/Plan - 2:  ·  Problem: Acute respiratory failure with hypoxia.  Plan: 91% on RA started on Remdesivir and Dexamethasone 2/1 per patient. CXR consistent with COVID. DDimer increased x 10 - dopplers 2/1 negative,, consider CTPE if worsening hypoxia or signs of PE  - Remdesivir x5 days (2/1-2/5)  - Dexamethasone x10 days (2/1-2/10)  - Supplemental O2  - incentive spirometer  - Lovenox DVT ppx.     Problem/Plan - 3:  ·  Problem: Pneumonia due to COVID-19 virus.  Plan: As above.     Problem/Plan - 4:  ·  Problem: Normocytic anemia.  Plan: Likely ACD, no signs of active/overt bleed    #Leukocytosis.  WBC  5  ->11.89 (2/2). Suspect 2/2 decadron. Cell diff similar to yest.   -monitor CBC with diff and signs of worsening infx.     Problem/Plan - 5:  ·  Problem: Glaucoma.  Plan: Familial. Multiple family members who have glaucoma. Takes timolol in AM and lumigan in PM at home.  -c/w home timolol and lumigan eye drops.     Problem/Plan - 6:  Problem: Nutrition, metabolism, and development symptoms. Plan: F: cautious fluid repletion in covid  E: Replete PRN K<4, Mg<2  N: Regular  DVT: Lovenox  GI: none  CODE: Full  Dispo: COVID tele.

## 2021-02-03 NOTE — PROGRESS NOTE ADULT - PROBLEM SELECTOR PLAN 6
F: cautious fluid repletion in covid  E: Replete PRN K<4, Mg<2  N: Regular  DVT: Lovenox  GI: none  CODE: Full  Dispo: COVID tele F: cautious fluid repletion in covid  E: Replete PRN K<4, Mg<2  N: Regular  DVT: heparin gtt  GI: none  CODE: Full  Dispo: COVID tele

## 2021-02-03 NOTE — PROGRESS NOTE ADULT - SUBJECTIVE AND OBJECTIVE BOX
Physical Medicine and Rehabilitation Progress Note:    Patient is a 50y old  Male who presents with a chief complaint of SOB (03 Feb 2021 08:28)      HPI:  50M no significant PMH presents for evaluation of cough, shortness of breath and pleuritic chest discomfort over the last 10 days. Patient reports that he tested positive on 1/21 and has been sheltering in place for the last 10 days. He was evaluated by his primary care physician and was sent in for antibody infusion but unfortunately did not meet the criteria. He also reports that he has had a decreased appetite as well as fatigue, fevers and dyspnea on exertion. Denies orthopnea, PND, weakness, numbness, focal deficits. Endorses initially having diarrhea that has now improved.    In the ED VS T 99.3 HR 79 /86 R 20 91% on RA   Labs: WBC 7 | Hg/Hct 13/42 | Plt 250 | Na 137 K 3.5 |  AST/ALT ALP 70/40 64 | Ferritin 314 D-dimer 538 CRP 11 | Procalcitonin 0.12 |  Imaging: CXR < from: Xray Chest 1 View- PORTABLE-Urgent (01.31.21 @ 14:04)  Hazy airspace opacity peripheral aspect of the left lower lobe, concerning for viral pneumonia such as COVID-19    ED Course: Dexamethasone 6mg, 1L LR, and Tylenol 650mg.        (01 Feb 2021 02:58)                            10.9   10.93 )-----------( 256      ( 03 Feb 2021 07:36 )             35.7       02-03    143  |  102  |  21  ----------------------------<  191<H>  4.2   |  32<H>  |  0.87    Ca    8.6      03 Feb 2021 07:36  Phos  3.6     02-02  Mg     2.4     02-02    TPro  6.2  /  Alb  3.2<L>  /  TBili  0.3  /  DBili  x   /  AST  30  /  ALT  31  /  AlkPhos  57  02-03    Vital Signs Last 24 Hrs  T(C): 37 (03 Feb 2021 09:11), Max: 37 (02 Feb 2021 21:16)  T(F): 98.6 (03 Feb 2021 09:11), Max: 98.6 (02 Feb 2021 21:16)  HR: 76 (03 Feb 2021 09:45) (72 - 81)  BP: 121/65 (03 Feb 2021 09:45) (120/62 - 141/76)  BP(mean): 72 (03 Feb 2021 09:45) (72 - 72)  RR: 18 (03 Feb 2021 09:45) (18 - 20)  SpO2: 95% (03 Feb 2021 09:45) (94% - 97%)    MEDICATIONS  (STANDING):  dexAMETHasone  Injectable 6 milliGRAM(s) IV Push every 24 hours  heparin  Infusion. 1300 Unit(s)/Hr (13 mL/Hr) IV Continuous <Continuous>  latanoprost 0.005% Ophthalmic Solution 1 Drop(s) Both EYES at bedtime  remdesivir  IVPB 100 milliGRAM(s) IV Intermittent every 24 hours  remdesivir  IVPB   IV Intermittent   timolol 0.5% Solution 1 Drop(s) Both EYES every 24 hours    MEDICATIONS  (PRN):  acetaminophen   Tablet .. 650 milliGRAM(s) Oral every 6 hours PRN Temp greater or equal to 38C (100.4F)  benzonatate 100 milliGRAM(s) Oral three times a day PRN Cough    Currently Undergoing Physical/ Occupational Therapy at bedside.    Functional Status Assessment:   2/2/2021    Previous Level of Function:     · Ambulation Skills	independent  · Transfer Skills	independent  · ADL Skills	independent  · Work/Leisure Activity	independent  · Additional Comments	Pt. reports 4 steps to enter and 1 flight to bedrooms. Pt. denies prior use of assistive device.    Cognitive Status Examination:   · Orientation	oriented to person, place, time and situation  · Level of Consciousness	alert  · Follows Commands and Answers Questions	100% of the time  · Personal Safety and Judgment	intact    Range of Motion Exam:   · Range of Motion Examination	bilateral upper extremity ROM was WFL (within functional limits); bilateral lower extremity ROM was WFL (within functional limits)    Manual Muscle Testing:   · Manual Muscle Testing Results	>3/5 throughout by functional assessment against gravity    Bed Mobility: Rolling/Turning:     · Level of [place]	independent    Bed Mobility: Scooting/Bridging:     · Level of [place]	independent    Bed Mobility: Sit to Supine:     · Level of [place]	NT - pt was left sitting    Bed Mobility: Supine to Sit:     · Level of [place]	independent    Transfer: Sit to Stand:     · Level of [place]	supervision; contact guard  · Physical Assist/Nonphysical Assist	1 person assist  · Weight-Bearing Restrictions	weight-bearing as tolerated    Transfer: Stand to Sit:     · Level of [place]	supervision  · Weight-Bearing Restrictions	weight-bearing as tolerated    Sit/Stand Transfer Safety Analysis:     · Impairments Contributing to Impaired Transfers	impaired balance    Gait Skills:     · Level of [place]	supervision; contact guard  · Physical Assist/Nonphysical Assist	1 person assist  · Weight-Bearing Restrictions	weight-bearing as tolerated  · Gait Distance	50 feet    Gait Analysis:     · Gait Deviations Noted	decreased sheba; decreased step length  · Impairments Contributing to Gait Deviations	impaired balance; tachypnea    Stair Negotiation:     · Level of [place]	TBA    Balance Skills Assessment:     · Sitting Balance: Static	good balance  · Sitting Balance: Dynamic	good balance  · Sit-to-Stand Balance	good balance  · Standing Balance: Static	good minus  · Standing Balance: Dynamic	good minus    Clinical Impressions:   · Criteria for Skilled Therapeutic Interventions	impairments found; functional limitations in following categories  · Impairments Found (describe specific impairments)	aerobic capacity/endurance; gait, locomotion, and balance  · Functional Limitations in Following Categories (describe specific limitations)	home management; community/leisure; work  · Rehab Potential	good, to achieve stated therapy goals  · Therapy Frequency	2-3x/week  · Predicted Duration of Therapy Intervention (days/wks)	Pt. would benefit from further gait, endurance, balance, stairs  training to facilitate return to prior level of function.        PM&R Impression: as above    Current Disposition Plan Recommendations:    d/c home

## 2021-02-03 NOTE — PROGRESS NOTE ADULT - PROBLEM SELECTOR PLAN 1
New PEs diagnosed on CTA 2/2/21 with evid. of R heart strain on CT, offical read to follow. Pt HDUS at this time. Trops, ckmb negative, CK mildly elevated. Started on heparin gtt 2/2/21 PM.  -repeat PTTs and dose heparin per nomogram  -f/u echo for further evaluation of heart fxn, strain  -f/u BNP  -no emergent intervention at this time per pulm, but wld ambulate in AM to assess his symptoms and in comb. with echo determine if may benefit from procedure  -f/u pulm/PERT recs New PEs diagnosed on CTA 2/2/21 with evid. of R heart strain on CT, offical read to follow. Pt HDUS at this time. Trops, ckmb negative, CK mildly elevated. Started on heparin gtt 2/2/21 PM.  -repeat PTTs q6h and dose heparin per nomogram, transition to lovenox or DOAC on 2/4  -f/u echo for further evaluation of heart fxn, strain  -f/u BNP  -no emergent intervention at this time per pulm, but wld ambulate to assess his symptoms- pulm does not anticipate endovascular intervention.   -f/u pulm/PERT recs- PESI score 79, which predicts a Class II risk (1.7-3.5% 30 day mortality) New PEs diagnosed on CTA 2/2/21 with evid. of R heart strain on CT, offical read to follow. Pt HDUS at this time. Trops, ckmb negative, CK mildly elevated. Started on heparin gtt 2/2/21 PM.  -repeat PTTs q6h and dose heparin per nomogram, transition to lovenox or DOAC on 2/4  -echo on 2/3- TTE bedside- No pericardial effusion. Normal left and right ventricular size and systolic function in limited views. There was insufficient tricuspid regurgitation detected from which to calculate pulmonary artery systolic pressure.  -f/u BNP  -no emergent intervention at this time per pulm, but wld ambulate to assess his symptoms- pulm does not anticipate endovascular intervention.   -f/u pulm/PERT recs- PESI score 79, which predicts a Class II risk (1.7-3.5% 30 day mortality)

## 2021-02-03 NOTE — PROVIDER CONTACT NOTE (CRITICAL VALUE NOTIFICATION) - ACTION/TREATMENT ORDERED:
Per Intern Daren, hold heparin gtt for 60 min and restart at 15cc/hr. Pt verbalized understanding of teaching

## 2021-02-03 NOTE — PROGRESS NOTE ADULT - SUBJECTIVE AND OBJECTIVE BOX
INTERVAL HPI/OVERNIGHT EVENTS:    SUBJECTIVE: Patient seen and examined at bedside.    OBJECTIVE:    VITAL SIGNS:  ICU Vital Signs Last 24 Hrs  T(C): 36.9 (03 Feb 2021 00:34), Max: 37.1 (02 Feb 2021 09:01)  T(F): 98.4 (03 Feb 2021 00:34), Max: 98.7 (02 Feb 2021 09:01)  HR: 75 (03 Feb 2021 00:34) (72 - 77)  BP: 120/62 (03 Feb 2021 00:34) (120/62 - 129/72)  BP(mean): --  ABP: --  ABP(mean): --  RR: 20 (03 Feb 2021 00:34) (18 - 20)  SpO2: 94% (03 Feb 2021 00:34) (83% - 96%)        02-02 @ 07:01  -  02-03 @ 06:22  --------------------------------------------------------  IN: 0 mL / OUT: 400 mL / NET: -400 mL      CAPILLARY BLOOD GLUCOSE          PHYSICAL EXAM:        MEDICATIONS:  MEDICATIONS  (STANDING):  dexAMETHasone  Injectable 6 milliGRAM(s) IV Push every 24 hours  heparin  Infusion 1800 Unit(s)/Hr (15 mL/Hr) IV Continuous <Continuous>  latanoprost 0.005% Ophthalmic Solution 1 Drop(s) Both EYES at bedtime  remdesivir  IVPB 100 milliGRAM(s) IV Intermittent every 24 hours  remdesivir  IVPB   IV Intermittent   timolol 0.5% Solution 1 Drop(s) Both EYES every 24 hours    MEDICATIONS  (PRN):  acetaminophen   Tablet .. 650 milliGRAM(s) Oral every 6 hours PRN Temp greater or equal to 38C (100.4F)  benzonatate 100 milliGRAM(s) Oral three times a day PRN Cough      ALLERGIES:  Allergies    No Known Allergies    Intolerances        LABS:                        11.4   11.89 )-----------( 267      ( 02 Feb 2021 06:36 )             37.8     02-02    144  |  104  |  20  ----------------------------<  174<H>  4.2   |  29  |  0.89    Ca    8.3<L>      02 Feb 2021 06:36  Phos  3.6     02-02  Mg     2.4     02-02    TPro  6.8  /  Alb  3.2<L>  /  TBili  0.4  /  DBili  x   /  AST  36  /  ALT  29  /  AlkPhos  64  02-02    PT/INR - ( 02 Feb 2021 06:36 )   PT: 13.7 sec;   INR: 1.15          PTT - ( 03 Feb 2021 02:38 )  PTT:130.3 sec      RADIOLOGY & ADDITIONAL TESTS: Reviewed. INTERVAL HPI/OVERNIGHT EVENTS:  PTT supratherapeuutic, heparin gtt held for 1 hr, and restarted at lower rate.     SUBJECTIVE: Patient seen and examined at bedside.     OBJECTIVE:    VITAL SIGNS:  ICU Vital Signs Last 24 Hrs  T(C): 36.9 (03 Feb 2021 00:34), Max: 37.1 (02 Feb 2021 09:01)  T(F): 98.4 (03 Feb 2021 00:34), Max: 98.7 (02 Feb 2021 09:01)  HR: 75 (03 Feb 2021 00:34) (72 - 77)  BP: 120/62 (03 Feb 2021 00:34) (120/62 - 129/72)  BP(mean): --  ABP: --  ABP(mean): --  RR: 20 (03 Feb 2021 00:34) (18 - 20)  SpO2: 94% (03 Feb 2021 00:34) (83% - 96%)        02-02 @ 07:01  -  02-03 @ 06:22  --------------------------------------------------------  IN: 0 mL / OUT: 400 mL / NET: -400 mL      CAPILLARY BLOOD GLUCOSE          PHYSICAL EXAM:  Constitutional: WDWN, NAD, lying comfortably in bed, on 3L NC  HEENT: EOMI   Respiratory: CTA b/l  Cardiovascular: RRR, normal S1S2, no M/R/G appreciated  Gastrointestinal: abd soft, nontender, nondistended   Extremities: legs warm, well perfused   Neurological: AAOx3   Skin: moist, diaphoretic      MEDICATIONS:  MEDICATIONS  (STANDING):  dexAMETHasone  Injectable 6 milliGRAM(s) IV Push every 24 hours  heparin  Infusion 1800 Unit(s)/Hr (15 mL/Hr) IV Continuous <Continuous>  latanoprost 0.005% Ophthalmic Solution 1 Drop(s) Both EYES at bedtime  remdesivir  IVPB 100 milliGRAM(s) IV Intermittent every 24 hours  remdesivir  IVPB   IV Intermittent   timolol 0.5% Solution 1 Drop(s) Both EYES every 24 hours    MEDICATIONS  (PRN):  acetaminophen   Tablet .. 650 milliGRAM(s) Oral every 6 hours PRN Temp greater or equal to 38C (100.4F)  benzonatate 100 milliGRAM(s) Oral three times a day PRN Cough      ALLERGIES:  Allergies    No Known Allergies    Intolerances        LABS:                        11.4   11.89 )-----------( 267      ( 02 Feb 2021 06:36 )             37.8     02-02    144  |  104  |  20  ----------------------------<  174<H>  4.2   |  29  |  0.89    Ca    8.3<L>      02 Feb 2021 06:36  Phos  3.6     02-02  Mg     2.4     02-02    TPro  6.8  /  Alb  3.2<L>  /  TBili  0.4  /  DBili  x   /  AST  36  /  ALT  29  /  AlkPhos  64  02-02    PT/INR - ( 02 Feb 2021 06:36 )   PT: 13.7 sec;   INR: 1.15          PTT - ( 03 Feb 2021 02:38 )  PTT:130.3 sec      RADIOLOGY & ADDITIONAL TESTS: Reviewed. INTERVAL HPI/OVERNIGHT EVENTS:  PTT supratherapeuutic, heparin gtt held for 1 hr, and restarted at lower rate.     SUBJECTIVE: Patient seen and examined at bedside. Reporting chest discomfort with breathing and SOB. Other ROS negative- denies CP, nausea, vomiting, abdominal pain, leg pain, diarrhea, constipation, urinary changes. On 3L NC.    OBJECTIVE:    VITAL SIGNS:  ICU Vital Signs Last 24 Hrs  T(C): 36.9 (03 Feb 2021 00:34), Max: 37.1 (02 Feb 2021 09:01)  T(F): 98.4 (03 Feb 2021 00:34), Max: 98.7 (02 Feb 2021 09:01)  HR: 75 (03 Feb 2021 00:34) (72 - 77)  BP: 120/62 (03 Feb 2021 00:34) (120/62 - 129/72)  BP(mean): --  ABP: --  ABP(mean): --  RR: 20 (03 Feb 2021 00:34) (18 - 20)  SpO2: 94% (03 Feb 2021 00:34) (83% - 96%)        02-02 @ 07:01  -  02-03 @ 06:22  --------------------------------------------------------  IN: 0 mL / OUT: 400 mL / NET: -400 mL      CAPILLARY BLOOD GLUCOSE      PHYSICAL EXAM:  Constitutional: WDWN, NAD, lying comfortably in bed, on 3L NC  HEENT: EOMI   Respiratory: CTA b/l  Cardiovascular: RRR, normal S1S2, no M/R/G appreciated  Gastrointestinal: abd soft, nontender, nondistended   Extremities: legs warm, well perfused   Neurological: AAOx3       MEDICATIONS:  MEDICATIONS  (STANDING):  dexAMETHasone  Injectable 6 milliGRAM(s) IV Push every 24 hours  heparin  Infusion 1800 Unit(s)/Hr (15 mL/Hr) IV Continuous <Continuous>  latanoprost 0.005% Ophthalmic Solution 1 Drop(s) Both EYES at bedtime  remdesivir  IVPB 100 milliGRAM(s) IV Intermittent every 24 hours  remdesivir  IVPB   IV Intermittent   timolol 0.5% Solution 1 Drop(s) Both EYES every 24 hours    MEDICATIONS  (PRN):  acetaminophen   Tablet .. 650 milliGRAM(s) Oral every 6 hours PRN Temp greater or equal to 38C (100.4F)  benzonatate 100 milliGRAM(s) Oral three times a day PRN Cough      ALLERGIES:  Allergies    No Known Allergies    Intolerances        LABS:                        11.4   11.89 )-----------( 267      ( 02 Feb 2021 06:36 )             37.8     02-02    144  |  104  |  20  ----------------------------<  174<H>  4.2   |  29  |  0.89    Ca    8.3<L>      02 Feb 2021 06:36  Phos  3.6     02-02  Mg     2.4     02-02    TPro  6.8  /  Alb  3.2<L>  /  TBili  0.4  /  DBili  x   /  AST  36  /  ALT  29  /  AlkPhos  64  02-02    PT/INR - ( 02 Feb 2021 06:36 )   PT: 13.7 sec;   INR: 1.15          PTT - ( 03 Feb 2021 02:38 )  PTT:130.3 sec      RADIOLOGY & ADDITIONAL TESTS: Reviewed.

## 2021-02-04 LAB
ALBUMIN SERPL ELPH-MCNC: 3.2 G/DL — LOW (ref 3.3–5)
ALP SERPL-CCNC: 76 U/L — SIGNIFICANT CHANGE UP (ref 40–120)
ALT FLD-CCNC: 73 U/L — HIGH (ref 10–45)
ANION GAP SERPL CALC-SCNC: 8 MMOL/L — SIGNIFICANT CHANGE UP (ref 5–17)
AST SERPL-CCNC: 87 U/L — HIGH (ref 10–40)
BASOPHILS # BLD AUTO: 0.01 K/UL — SIGNIFICANT CHANGE UP (ref 0–0.2)
BASOPHILS NFR BLD AUTO: 0.1 % — SIGNIFICANT CHANGE UP (ref 0–2)
BILIRUB SERPL-MCNC: 0.4 MG/DL — SIGNIFICANT CHANGE UP (ref 0.2–1.2)
BUN SERPL-MCNC: 22 MG/DL — SIGNIFICANT CHANGE UP (ref 7–23)
CALCIUM SERPL-MCNC: 8.7 MG/DL — SIGNIFICANT CHANGE UP (ref 8.4–10.5)
CHLORIDE SERPL-SCNC: 102 MMOL/L — SIGNIFICANT CHANGE UP (ref 96–108)
CO2 SERPL-SCNC: 31 MMOL/L — SIGNIFICANT CHANGE UP (ref 22–31)
CREAT SERPL-MCNC: 0.89 MG/DL — SIGNIFICANT CHANGE UP (ref 0.5–1.3)
CRP SERPL-MCNC: 1.78 MG/DL — HIGH (ref 0–0.4)
D DIMER BLD IA.RAPID-MCNC: 4823 NG/ML DDU — HIGH
EOSINOPHIL # BLD AUTO: 0 K/UL — SIGNIFICANT CHANGE UP (ref 0–0.5)
EOSINOPHIL NFR BLD AUTO: 0 % — SIGNIFICANT CHANGE UP (ref 0–6)
FERRITIN SERPL-MCNC: 224 NG/ML — SIGNIFICANT CHANGE UP (ref 30–400)
GLUCOSE SERPL-MCNC: 168 MG/DL — HIGH (ref 70–99)
HCT VFR BLD CALC: 38.6 % — LOW (ref 39–50)
HGB BLD-MCNC: 11.5 G/DL — LOW (ref 13–17)
IMM GRANULOCYTES NFR BLD AUTO: 0.6 % — SIGNIFICANT CHANGE UP (ref 0–1.5)
LYMPHOCYTES # BLD AUTO: 1.04 K/UL — SIGNIFICANT CHANGE UP (ref 1–3.3)
LYMPHOCYTES # BLD AUTO: 9.4 % — LOW (ref 13–44)
MAGNESIUM SERPL-MCNC: 2.6 MG/DL — SIGNIFICANT CHANGE UP (ref 1.6–2.6)
MCHC RBC-ENTMCNC: 24.8 PG — LOW (ref 27–34)
MCHC RBC-ENTMCNC: 29.8 GM/DL — LOW (ref 32–36)
MCV RBC AUTO: 83.2 FL — SIGNIFICANT CHANGE UP (ref 80–100)
MONOCYTES # BLD AUTO: 0.94 K/UL — HIGH (ref 0–0.9)
MONOCYTES NFR BLD AUTO: 8.5 % — SIGNIFICANT CHANGE UP (ref 2–14)
NEUTROPHILS # BLD AUTO: 8.99 K/UL — HIGH (ref 1.8–7.4)
NEUTROPHILS NFR BLD AUTO: 81.4 % — HIGH (ref 43–77)
NRBC # BLD: 0 /100 WBCS — SIGNIFICANT CHANGE UP (ref 0–0)
PHOSPHATE SERPL-MCNC: 3.7 MG/DL — SIGNIFICANT CHANGE UP (ref 2.5–4.5)
PLATELET # BLD AUTO: 243 K/UL — SIGNIFICANT CHANGE UP (ref 150–400)
POTASSIUM SERPL-MCNC: 4.4 MMOL/L — SIGNIFICANT CHANGE UP (ref 3.5–5.3)
POTASSIUM SERPL-SCNC: 4.4 MMOL/L — SIGNIFICANT CHANGE UP (ref 3.5–5.3)
PROT SERPL-MCNC: 6.5 G/DL — SIGNIFICANT CHANGE UP (ref 6–8.3)
RBC # BLD: 4.64 M/UL — SIGNIFICANT CHANGE UP (ref 4.2–5.8)
RBC # FLD: 13.2 % — SIGNIFICANT CHANGE UP (ref 10.3–14.5)
SODIUM SERPL-SCNC: 141 MMOL/L — SIGNIFICANT CHANGE UP (ref 135–145)
WBC # BLD: 11.05 K/UL — HIGH (ref 3.8–10.5)
WBC # FLD AUTO: 11.05 K/UL — HIGH (ref 3.8–10.5)

## 2021-02-04 PROCEDURE — 99233 SBSQ HOSP IP/OBS HIGH 50: CPT | Mod: GC

## 2021-02-04 RX ORDER — POLYETHYLENE GLYCOL 3350 17 G/17G
17 POWDER, FOR SOLUTION ORAL EVERY 24 HOURS
Refills: 0 | Status: DISCONTINUED | OUTPATIENT
Start: 2021-02-04 | End: 2021-02-05

## 2021-02-04 RX ORDER — SENNA PLUS 8.6 MG/1
2 TABLET ORAL AT BEDTIME
Refills: 0 | Status: DISCONTINUED | OUTPATIENT
Start: 2021-02-04 | End: 2021-02-05

## 2021-02-04 RX ADMIN — APIXABAN 10 MILLIGRAM(S): 2.5 TABLET, FILM COATED ORAL at 05:26

## 2021-02-04 RX ADMIN — SENNA PLUS 2 TABLET(S): 8.6 TABLET ORAL at 21:14

## 2021-02-04 RX ADMIN — LATANOPROST 1 DROP(S): 0.05 SOLUTION/ DROPS OPHTHALMIC; TOPICAL at 21:14

## 2021-02-04 RX ADMIN — POLYETHYLENE GLYCOL 3350 17 GRAM(S): 17 POWDER, FOR SOLUTION ORAL at 09:46

## 2021-02-04 RX ADMIN — APIXABAN 10 MILLIGRAM(S): 2.5 TABLET, FILM COATED ORAL at 17:53

## 2021-02-04 RX ADMIN — Medication 1 DROP(S): at 06:59

## 2021-02-04 RX ADMIN — Medication 6 MILLIGRAM(S): at 12:25

## 2021-02-04 RX ADMIN — Medication 100 MILLIGRAM(S): at 11:51

## 2021-02-04 RX ADMIN — REMDESIVIR 500 MILLIGRAM(S): 5 INJECTION INTRAVENOUS at 05:25

## 2021-02-04 NOTE — PROGRESS NOTE ADULT - PROBLEM SELECTOR PLAN 4
Likely ACD, no signs of active/overt bleed    #Leukocytosis.  WBC  5  ->11.89 (2/2). Suspect 2/2 decadron.   -monitor CBC with diff and signs of worsening infx

## 2021-02-04 NOTE — PROGRESS NOTE ADULT - ASSESSMENT
50M with glaucoma presents for evaluation of cough, shortness of breath and pleuritic chest discomfort over the last 10 days PTA, admitted for acute hypoxic respiratory failure 2/2 COVID, found to have submassive PE w/ RHS on CT, currently on heparin gtt for A/C. 50M with glaucoma presents for evaluation of cough, shortness of breath and pleuritic chest discomfort over the last 10 days PTA, admitted to [facility] for acute hypoxic respiratory failure 2/2 COVID, found to have submassive PE w/ RHS on CT, -> stepped up to COVID tele, started on heparin gtt and transitioned to eliquis for PE treatment, echo with no evidence of rv failure or strain, no endovascular intervention as per pulm/PERC team. Stable for stepdown to [facility] on 2/4, satting well on 3 L NC.

## 2021-02-04 NOTE — PROGRESS NOTE ADULT - SUBJECTIVE AND OBJECTIVE BOX
INTERVAL HPI/OVERNIGHT EVENTS:    SUBJECTIVE: Patient seen and examined at bedside.    OBJECTIVE:    VITAL SIGNS:  ICU Vital Signs Last 24 Hrs  T(C): 36.7 (04 Feb 2021 06:07), Max: 37.3 (03 Feb 2021 17:56)  T(F): 98.1 (04 Feb 2021 06:07), Max: 99.1 (03 Feb 2021 17:56)  HR: 60 (04 Feb 2021 05:09) (60 - 85)  BP: 110/68 (04 Feb 2021 05:09) (110/68 - 140/81)  BP(mean): 72 (03 Feb 2021 09:45) (72 - 72)  ABP: --  ABP(mean): --  RR: 16 (04 Feb 2021 05:09) (16 - 22)  SpO2: 96% (04 Feb 2021 05:09) (92% - 97%)        02-02 @ 07:01  -  02-03 @ 07:00  --------------------------------------------------------  IN: 0 mL / OUT: 400 mL / NET: -400 mL    02-03 @ 07:01  -  02-04 @ 06:15  --------------------------------------------------------  IN: 720 mL / OUT: 900 mL / NET: -180 mL      CAPILLARY BLOOD GLUCOSE      POCT Blood Glucose.: 246 mg/dL (03 Feb 2021 11:36)      PHYSICAL EXAM:        MEDICATIONS:  MEDICATIONS  (STANDING):  apixaban 10 milliGRAM(s) Oral every 12 hours  dexAMETHasone  Injectable 6 milliGRAM(s) IV Push every 24 hours  latanoprost 0.005% Ophthalmic Solution 1 Drop(s) Both EYES at bedtime  remdesivir  IVPB 100 milliGRAM(s) IV Intermittent every 24 hours  remdesivir  IVPB   IV Intermittent   timolol 0.5% Solution 1 Drop(s) Both EYES every 24 hours    MEDICATIONS  (PRN):  acetaminophen   Tablet .. 650 milliGRAM(s) Oral every 6 hours PRN Temp greater or equal to 38C (100.4F)  benzonatate 100 milliGRAM(s) Oral three times a day PRN Cough      ALLERGIES:  Allergies    No Known Allergies    Intolerances        LABS:                        10.9   10.93 )-----------( 256      ( 03 Feb 2021 07:36 )             35.7     02-03    143  |  102  |  21  ----------------------------<  191<H>  4.2   |  32<H>  |  0.87    Ca    8.6      03 Feb 2021 07:36  Phos  3.6     02-02  Mg     2.4     02-02    TPro  6.2  /  Alb  3.2<L>  /  TBili  0.3  /  DBili  x   /  AST  30  /  ALT  31  /  AlkPhos  57  02-03    PT/INR - ( 02 Feb 2021 06:36 )   PT: 13.7 sec;   INR: 1.15          PTT - ( 03 Feb 2021 16:57 )  PTT:150.7 sec      RADIOLOGY & ADDITIONAL TESTS: Reviewed. ****TRANSFER NOTE FROM [address] TO [facility]****    HOSPITAL COURSE:    INTERVAL HPI/OVERNIGHT EVENTS:  BRIANA.    SUBJECTIVE: Patient seen and examined at bedside.    OBJECTIVE:    VITAL SIGNS:  ICU Vital Signs Last 24 Hrs  T(C): 36.7 (04 Feb 2021 06:07), Max: 37.3 (03 Feb 2021 17:56)  T(F): 98.1 (04 Feb 2021 06:07), Max: 99.1 (03 Feb 2021 17:56)  HR: 60 (04 Feb 2021 05:09) (60 - 85)  BP: 110/68 (04 Feb 2021 05:09) (110/68 - 140/81)  BP(mean): 72 (03 Feb 2021 09:45) (72 - 72)  ABP: --  ABP(mean): --  RR: 16 (04 Feb 2021 05:09) (16 - 22)  SpO2: 96% (04 Feb 2021 05:09) (92% - 97%)        02-02 @ 07:01  -  02-03 @ 07:00  --------------------------------------------------------  IN: 0 mL / OUT: 400 mL / NET: -400 mL    02-03 @ 07:01  -  02-04 @ 06:15  --------------------------------------------------------  IN: 720 mL / OUT: 900 mL / NET: -180 mL      CAPILLARY BLOOD GLUCOSE      POCT Blood Glucose.: 246 mg/dL (03 Feb 2021 11:36)      PHYSICAL EXAM:        MEDICATIONS:  MEDICATIONS  (STANDING):  apixaban 10 milliGRAM(s) Oral every 12 hours  dexAMETHasone  Injectable 6 milliGRAM(s) IV Push every 24 hours  latanoprost 0.005% Ophthalmic Solution 1 Drop(s) Both EYES at bedtime  remdesivir  IVPB 100 milliGRAM(s) IV Intermittent every 24 hours  remdesivir  IVPB   IV Intermittent   timolol 0.5% Solution 1 Drop(s) Both EYES every 24 hours    MEDICATIONS  (PRN):  acetaminophen   Tablet .. 650 milliGRAM(s) Oral every 6 hours PRN Temp greater or equal to 38C (100.4F)  benzonatate 100 milliGRAM(s) Oral three times a day PRN Cough      ALLERGIES:  Allergies    No Known Allergies    Intolerances        LABS:                        10.9   10.93 )-----------( 256      ( 03 Feb 2021 07:36 )             35.7     02-03    143  |  102  |  21  ----------------------------<  191<H>  4.2   |  32<H>  |  0.87    Ca    8.6      03 Feb 2021 07:36  Phos  3.6     02-02  Mg     2.4     02-02    TPro  6.2  /  Alb  3.2<L>  /  TBili  0.3  /  DBili  x   /  AST  30  /  ALT  31  /  AlkPhos  57  02-03    PT/INR - ( 02 Feb 2021 06:36 )   PT: 13.7 sec;   INR: 1.15          PTT - ( 03 Feb 2021 16:57 )  PTT:150.7 sec      RADIOLOGY & ADDITIONAL TESTS: Reviewed. ****TRANSFER NOTE FROM [address] TO [facility]****    HOSPITAL COURSE: 50M with glaucoma presents for evaluation of cough, shortness of breath and pleuritic chest discomfort over the last 10 days PTA, admitted to [facility] for acute hypoxic respiratory failure 2/2 COVID, found to have submassive bilateral PE w/ RHS on CT -> stepped up to COVID tele, started on heparin gtt and transitioned to eliquis for PE treatment, echo with no evidence of rv failure or strain, no endovascular intervention as per pulm/PERC team. Stable for stepdown to [facility] on 2/4, satting well on 3 L NC.     INTERVAL HPI/OVERNIGHT EVENTS:  BRIANA.    SUBJECTIVE: Patient seen and examined at bedside. Pt. comfortable, A&Ox3, on 3L NC. Reports constipation- requesting bowel regimen. Denies CP, SOB, nausea, vomiting, abdominal pain, leg pain, diarrhea, urinary changes.     OBJECTIVE:    VITAL SIGNS:  ICU Vital Signs Last 24 Hrs  T(C): 36.7 (04 Feb 2021 06:07), Max: 37.3 (03 Feb 2021 17:56)  T(F): 98.1 (04 Feb 2021 06:07), Max: 99.1 (03 Feb 2021 17:56)  HR: 60 (04 Feb 2021 05:09) (60 - 85)  BP: 110/68 (04 Feb 2021 05:09) (110/68 - 140/81)  BP(mean): 72 (03 Feb 2021 09:45) (72 - 72)  ABP: --  ABP(mean): --  RR: 16 (04 Feb 2021 05:09) (16 - 22)  SpO2: 96% (04 Feb 2021 05:09) (92% - 97%)        02-02 @ 07:01  -  02-03 @ 07:00  --------------------------------------------------------  IN: 0 mL / OUT: 400 mL / NET: -400 mL    02-03 @ 07:01  -  02-04 @ 06:15  --------------------------------------------------------  IN: 720 mL / OUT: 900 mL / NET: -180 mL      CAPILLARY BLOOD GLUCOSE      POCT Blood Glucose.: 246 mg/dL (03 Feb 2021 11:36)      PHYSICAL EXAM:  Constitutional: WDWN, NAD, lying comfortably in bed, on 3L NC  HEENT: EOMI   Respiratory: CTA b/l  Cardiovascular: RRR, normal S1S2, no M/R/G appreciated  Gastrointestinal: abd soft, nontender, nondistended   Extremities: legs warm, well perfused   Neurological: AAOx3       MEDICATIONS:  MEDICATIONS  (STANDING):  apixaban 10 milliGRAM(s) Oral every 12 hours  dexAMETHasone  Injectable 6 milliGRAM(s) IV Push every 24 hours  latanoprost 0.005% Ophthalmic Solution 1 Drop(s) Both EYES at bedtime  remdesivir  IVPB 100 milliGRAM(s) IV Intermittent every 24 hours  remdesivir  IVPB   IV Intermittent   timolol 0.5% Solution 1 Drop(s) Both EYES every 24 hours    MEDICATIONS  (PRN):  acetaminophen   Tablet .. 650 milliGRAM(s) Oral every 6 hours PRN Temp greater or equal to 38C (100.4F)  benzonatate 100 milliGRAM(s) Oral three times a day PRN Cough      ALLERGIES:  Allergies    No Known Allergies    Intolerances        LABS:                        10.9   10.93 )-----------( 256      ( 03 Feb 2021 07:36 )             35.7     02-03    143  |  102  |  21  ----------------------------<  191<H>  4.2   |  32<H>  |  0.87    Ca    8.6      03 Feb 2021 07:36  Phos  3.6     02-02  Mg     2.4     02-02    TPro  6.2  /  Alb  3.2<L>  /  TBili  0.3  /  DBili  x   /  AST  30  /  ALT  31  /  AlkPhos  57  02-03    PT/INR - ( 02 Feb 2021 06:36 )   PT: 13.7 sec;   INR: 1.15          PTT - ( 03 Feb 2021 16:57 )  PTT:150.7 sec      RADIOLOGY & ADDITIONAL TESTS: Reviewed.

## 2021-02-04 NOTE — PROGRESS NOTE ADULT - PROBLEM SELECTOR PLAN 6
F: cautious fluid repletion in covid  E: Replete PRN K<4, Mg<2  N: Regular  DVT: heparin gtt  GI: none  CODE: Full  Dispo: COVID tele F: cautious fluid repletion in covid  E: Replete PRN K<4, Mg<2  N: Regular  DVTppx : eliquis treatment for PE  GI: none  CODE: Full  Dispo: JON

## 2021-02-04 NOTE — PROGRESS NOTE ADULT - PROBLEM SELECTOR PLAN 1
New PEs diagnosed on CTA 2/2/21 with evid. of R heart strain on CT, offical read to follow. Pt HDUS at this time. Trops, ckmb negative, CK mildly elevated. Started on heparin gtt 2/2/21 PM.  -repeat PTTs q6h and dose heparin per nomogram, transition to lovenox or DOAC on 2/4  -echo on 2/3- TTE bedside- No pericardial effusion. Normal left and right ventricular size and systolic function in limited views. There was insufficient tricuspid regurgitation detected from which to calculate pulmonary artery systolic pressure.  -f/u BNP  -no emergent intervention at this time per pulm, but wld ambulate to assess his symptoms- pulm does not anticipate endovascular intervention.   -f/u pulm/PERT recs- PESI score 79, which predicts a Class II risk (1.7-3.5% 30 day mortality) New PEs diagnosed on CTA 2/2/21 with evid. of R heart strain on CT- Extensive pulmonary thromboemboli in the distal bilateral main pulmonary arteries, truncus anterior, right interlobar, and left upper and lower lobar branches, as well as segmental and subsegmental branches bilaterally.  -however, on TTE on 2/3- no evidence of right heart strain  -Started on heparin gtt 2/2/21 PM and transitioned to eliquis on 2/3 (10 mg BID for 7 days, 5 mg BID after)  -echo on 2/3- TTE bedside- Normal left and right ventricular size and systolic function in limited views. There was insufficient tricuspid regurgitation detected from which to calculate pulmonary artery systolic pressure.  -pulm/PERT signed off- PESI score 79, which predicts a Class II risk (1.7-3.5% 30 day mortality),  no evidence of rv failure or strain on echo, no endovascular intervention  - please arrange for outpatient pulmonology f/u in 1 month from discharge with Dr. Julian

## 2021-02-04 NOTE — PROGRESS NOTE ADULT - PROBLEM SELECTOR PLAN 2
91% on RA started on Remdesivir and Dexamethasone 2/1 per patient. CXR consistent with COVID. DDimer increased x 10 - dopplers 2/1 negative,, consider CTPE if worsening hypoxia or signs of PE  - Remdesivir x5 days (2/1-2/5)  - Dexamethasone x10 days (2/1-2/10)  - Supplemental O2  - incentive spirometer  -on heparin ggt for DVT ppx- transition to lovenox or DOAC on 2/4 91% on RA started on Remdesivir and Dexamethasone 2/1.  -CT chest/ CXR consistent with COVID- Patchy and groundglass opacities throughout both lungs  - Remdesivir x5 days (2/1-2/5)  - Dexamethasone x10 days (2/1-2/10)  - Supplemental O2  - incentive spirometer  -wean O2 as tolerated

## 2021-02-05 ENCOUNTER — TRANSCRIPTION ENCOUNTER (OUTPATIENT)
Age: 51
End: 2021-02-05

## 2021-02-05 VITALS — TEMPERATURE: 98 F

## 2021-02-05 PROBLEM — H40.9 UNSPECIFIED GLAUCOMA: Chronic | Status: ACTIVE | Noted: 2021-02-01

## 2021-02-05 LAB
ALBUMIN SERPL ELPH-MCNC: 3.1 G/DL — LOW (ref 3.3–5)
ALP SERPL-CCNC: 69 U/L — SIGNIFICANT CHANGE UP (ref 40–120)
ALT FLD-CCNC: 110 U/L — HIGH (ref 10–45)
ANION GAP SERPL CALC-SCNC: 7 MMOL/L — SIGNIFICANT CHANGE UP (ref 5–17)
AST SERPL-CCNC: 88 U/L — HIGH (ref 10–40)
BASOPHILS # BLD AUTO: 0.02 K/UL — SIGNIFICANT CHANGE UP (ref 0–0.2)
BASOPHILS NFR BLD AUTO: 0.2 % — SIGNIFICANT CHANGE UP (ref 0–2)
BILIRUB SERPL-MCNC: 0.4 MG/DL — SIGNIFICANT CHANGE UP (ref 0.2–1.2)
BUN SERPL-MCNC: 22 MG/DL — SIGNIFICANT CHANGE UP (ref 7–23)
CALCIUM SERPL-MCNC: 8.8 MG/DL — SIGNIFICANT CHANGE UP (ref 8.4–10.5)
CHLORIDE SERPL-SCNC: 103 MMOL/L — SIGNIFICANT CHANGE UP (ref 96–108)
CO2 SERPL-SCNC: 31 MMOL/L — SIGNIFICANT CHANGE UP (ref 22–31)
CREAT SERPL-MCNC: 0.9 MG/DL — SIGNIFICANT CHANGE UP (ref 0.5–1.3)
CRP SERPL-MCNC: 1.27 MG/DL — HIGH (ref 0–0.4)
D DIMER BLD IA.RAPID-MCNC: 4648 NG/ML DDU — HIGH
EOSINOPHIL # BLD AUTO: 0 K/UL — SIGNIFICANT CHANGE UP (ref 0–0.5)
EOSINOPHIL NFR BLD AUTO: 0 % — SIGNIFICANT CHANGE UP (ref 0–6)
FERRITIN SERPL-MCNC: 198 NG/ML — SIGNIFICANT CHANGE UP (ref 30–400)
GLUCOSE SERPL-MCNC: 175 MG/DL — HIGH (ref 70–99)
HCT VFR BLD CALC: 38 % — LOW (ref 39–50)
HGB BLD-MCNC: 11.3 G/DL — LOW (ref 13–17)
IMM GRANULOCYTES NFR BLD AUTO: 1.3 % — SIGNIFICANT CHANGE UP (ref 0–1.5)
LYMPHOCYTES # BLD AUTO: 1.04 K/UL — SIGNIFICANT CHANGE UP (ref 1–3.3)
LYMPHOCYTES # BLD AUTO: 8 % — LOW (ref 13–44)
MAGNESIUM SERPL-MCNC: 2.5 MG/DL — SIGNIFICANT CHANGE UP (ref 1.6–2.6)
MCHC RBC-ENTMCNC: 24.7 PG — LOW (ref 27–34)
MCHC RBC-ENTMCNC: 29.7 GM/DL — LOW (ref 32–36)
MCV RBC AUTO: 83.2 FL — SIGNIFICANT CHANGE UP (ref 80–100)
MONOCYTES # BLD AUTO: 1.19 K/UL — HIGH (ref 0–0.9)
MONOCYTES NFR BLD AUTO: 9.2 % — SIGNIFICANT CHANGE UP (ref 2–14)
NEUTROPHILS # BLD AUTO: 10.53 K/UL — HIGH (ref 1.8–7.4)
NEUTROPHILS NFR BLD AUTO: 81.3 % — HIGH (ref 43–77)
NRBC # BLD: 0 /100 WBCS — SIGNIFICANT CHANGE UP (ref 0–0)
PHOSPHATE SERPL-MCNC: 3.7 MG/DL — SIGNIFICANT CHANGE UP (ref 2.5–4.5)
PLATELET # BLD AUTO: 257 K/UL — SIGNIFICANT CHANGE UP (ref 150–400)
POTASSIUM SERPL-MCNC: 5.1 MMOL/L — SIGNIFICANT CHANGE UP (ref 3.5–5.3)
POTASSIUM SERPL-SCNC: 5.1 MMOL/L — SIGNIFICANT CHANGE UP (ref 3.5–5.3)
PROT SERPL-MCNC: 6.4 G/DL — SIGNIFICANT CHANGE UP (ref 6–8.3)
RBC # BLD: 4.57 M/UL — SIGNIFICANT CHANGE UP (ref 4.2–5.8)
RBC # FLD: 13 % — SIGNIFICANT CHANGE UP (ref 10.3–14.5)
SODIUM SERPL-SCNC: 141 MMOL/L — SIGNIFICANT CHANGE UP (ref 135–145)
WBC # BLD: 12.95 K/UL — HIGH (ref 3.8–10.5)
WBC # FLD AUTO: 12.95 K/UL — HIGH (ref 3.8–10.5)

## 2021-02-05 PROCEDURE — 82728 ASSAY OF FERRITIN: CPT

## 2021-02-05 PROCEDURE — 83735 ASSAY OF MAGNESIUM: CPT

## 2021-02-05 PROCEDURE — 93321 DOPPLER ECHO F-UP/LMTD STD: CPT

## 2021-02-05 PROCEDURE — 84145 PROCALCITONIN (PCT): CPT

## 2021-02-05 PROCEDURE — 99239 HOSP IP/OBS DSCHRG MGMT >30: CPT | Mod: GC

## 2021-02-05 PROCEDURE — 85025 COMPLETE CBC W/AUTO DIFF WBC: CPT

## 2021-02-05 PROCEDURE — 84484 ASSAY OF TROPONIN QUANT: CPT

## 2021-02-05 PROCEDURE — 80053 COMPREHEN METABOLIC PANEL: CPT

## 2021-02-05 PROCEDURE — 85730 THROMBOPLASTIN TIME PARTIAL: CPT

## 2021-02-05 PROCEDURE — 84100 ASSAY OF PHOSPHORUS: CPT

## 2021-02-05 PROCEDURE — 82962 GLUCOSE BLOOD TEST: CPT

## 2021-02-05 PROCEDURE — 86140 C-REACTIVE PROTEIN: CPT

## 2021-02-05 PROCEDURE — 82550 ASSAY OF CK (CPK): CPT

## 2021-02-05 PROCEDURE — 97116 GAIT TRAINING THERAPY: CPT

## 2021-02-05 PROCEDURE — 97161 PT EVAL LOW COMPLEX 20 MIN: CPT

## 2021-02-05 PROCEDURE — 36415 COLL VENOUS BLD VENIPUNCTURE: CPT

## 2021-02-05 PROCEDURE — 71275 CT ANGIOGRAPHY CHEST: CPT

## 2021-02-05 PROCEDURE — 86900 BLOOD TYPING SEROLOGIC ABO: CPT

## 2021-02-05 PROCEDURE — 93970 EXTREMITY STUDY: CPT

## 2021-02-05 PROCEDURE — 86901 BLOOD TYPING SEROLOGIC RH(D): CPT

## 2021-02-05 PROCEDURE — 82553 CREATINE MB FRACTION: CPT

## 2021-02-05 PROCEDURE — 83880 ASSAY OF NATRIURETIC PEPTIDE: CPT

## 2021-02-05 PROCEDURE — 85379 FIBRIN DEGRADATION QUANT: CPT

## 2021-02-05 PROCEDURE — 86850 RBC ANTIBODY SCREEN: CPT

## 2021-02-05 PROCEDURE — 85610 PROTHROMBIN TIME: CPT

## 2021-02-05 RX ORDER — APIXABAN 2.5 MG/1
1 TABLET, FILM COATED ORAL
Qty: 0 | Refills: 0 | DISCHARGE
Start: 2021-02-05

## 2021-02-05 RX ORDER — APIXABAN 2.5 MG/1
1 TABLET, FILM COATED ORAL
Qty: 60 | Refills: 0
Start: 2021-02-05

## 2021-02-05 RX ORDER — DEXAMETHASONE 0.5 MG/5ML
1 ELIXIR ORAL
Qty: 5 | Refills: 0
Start: 2021-02-05

## 2021-02-05 RX ORDER — APIXABAN 2.5 MG/1
2 TABLET, FILM COATED ORAL
Qty: 20 | Refills: 0
Start: 2021-02-05 | End: 2021-02-09

## 2021-02-05 RX ORDER — APIXABAN 2.5 MG/1
2 TABLET, FILM COATED ORAL
Qty: 0 | Refills: 0 | DISCHARGE
Start: 2021-02-05

## 2021-02-05 RX ADMIN — REMDESIVIR 500 MILLIGRAM(S): 5 INJECTION INTRAVENOUS at 05:17

## 2021-02-05 RX ADMIN — Medication 1 DROP(S): at 07:27

## 2021-02-05 RX ADMIN — APIXABAN 10 MILLIGRAM(S): 2.5 TABLET, FILM COATED ORAL at 05:18

## 2021-02-05 RX ADMIN — Medication 6 MILLIGRAM(S): at 11:31

## 2021-02-05 RX ADMIN — POLYETHYLENE GLYCOL 3350 17 GRAM(S): 17 POWDER, FOR SOLUTION ORAL at 11:32

## 2021-02-05 RX ADMIN — APIXABAN 10 MILLIGRAM(S): 2.5 TABLET, FILM COATED ORAL at 16:56

## 2021-02-05 NOTE — PROGRESS NOTE ADULT - ASSESSMENT
50M with glaucoma presents for evaluation of cough, shortness of breath and pleuritic chest discomfort over the last 10 days PTA, admitted to [facility] for acute hypoxic respiratory failure 2/2 COVID, found to have submassive PE w/ RHS on CT, -> stepped up to COVID tele, started on heparin gtt and transitioned to eliquis for PE treatment, echo with no evidence of rv failure or strain, no endovascular intervention as per pulm/PERC team. Stable for stepdown to [facility] on 2/4, satting well on 3 L NC.  50M with glaucoma presents for evaluation of cough, shortness of breath and pleuritic chest discomfort over the last 10 days PTA, admitted to [facility] for acute hypoxic respiratory failure 2/2 COVID, found to have submassive PE w/ RHS on CT, -> stepped up to COVID tele, started on heparin gtt and transitioned to eliquis for PE treatment, echo with no evidence of rv failure or strain, no endovascular intervention as per pulm/PERC team. Stable for stepdown to [facility] on 2/4, satting well on 3 L NC, now weaned to room air.

## 2021-02-05 NOTE — PROGRESS NOTE ADULT - PROBLEM SELECTOR PLAN 6
F: cautious fluid repletion in covid  E: Replete PRN K<4, Mg<2  N: Regular  DVTppx : eliquis treatment for PE  GI: none  CODE: Full  Dispo: JON

## 2021-02-05 NOTE — PROGRESS NOTE ADULT - PROBLEM SELECTOR PLAN 1
New PEs diagnosed on CTA 2/2/21 with evid. of R heart strain on CT- Extensive pulmonary thromboemboli in the distal bilateral main pulmonary arteries, truncus anterior, right interlobar, and left upper and lower lobar branches, as well as segmental and subsegmental branches bilaterally.  -however, on TTE on 2/3- no evidence of right heart strain  -Started on heparin gtt 2/2/21 PM and transitioned to eliquis on 2/3 (10 mg BID for 7 days, 5 mg BID after)  -echo on 2/3- TTE bedside- Normal left and right ventricular size and systolic function in limited views. There was insufficient tricuspid regurgitation detected from which to calculate pulmonary artery systolic pressure.  -pulm/PERT signed off- PESI score 79, which predicts a Class II risk (1.7-3.5% 30 day mortality),  no evidence of rv failure or strain on echo, no endovascular intervention  - please arrange for outpatient pulmonology f/u in 1 month from discharge with Dr. Julian

## 2021-02-05 NOTE — PROGRESS NOTE ADULT - PROVIDER SPECIALTY LIST ADULT
Internal Medicine
Rehab Medicine
Pulmonology
Internal Medicine

## 2021-02-05 NOTE — PROGRESS NOTE ADULT - PROBLEM SELECTOR PLAN 2
91% on RA started on Remdesivir and Dexamethasone 2/1.  -CT chest/ CXR consistent with COVID- Patchy and groundglass opacities throughout both lungs  - Remdesivir x5 days (2/1-2/5)  - Dexamethasone x10 days (2/1-2/10)  - Supplemental O2  - incentive spirometer  -wean O2 as tolerated

## 2021-02-05 NOTE — PROGRESS NOTE ADULT - ATTENDING COMMENTS
acute hypoxic respiratory failure in the setting of COVID-19 pneumonia and bilateral PE.   continue heparin and can transition to DOAC.  continue with dexamathasone and remdesevir for COVID-19 pneumonia   Wean supplemental oxygen as tolerated to maintain sats >92%  obtain ECHO.
Pt seen and examined at bedside on 2/2/2021 - reports worsened HUFFMAN, saturations worsened on RA. Inflammatory markers downtrending but D Dimer increased. Dopplers negative yesterday. Will check CT PE - set patient up with home oxygen
Patient discussed with resident team and plan of care reviewed. I have personally reviewed all pertinent labs and imaging and performed an independent history and physical. Resident note personally reviewed, and I agree with above resident note with the following additions:    50YOM with history of glaucoma admitted for acute hypoxemic respiratory failure 2/2 COVID19 pneumonia. Hospital course complicated by extensive bilateral PE, leading to transfer to telemetry unit for closer monitoring. Clinically improved with anticoagulation and dexamethasone/remdesivir, and transferred to [facility] 2/4.    Doing well this morning. Finished remdesivir this morning. SpO2 normal on room air at rest though did desat to 85% with ambulation with me so will require home O2 with exertion. Medically ready for discharge home pending delivery of home O2 - will be discharged to finish 10 days dexamethasone, Eliquis load/maintenance dosing x 3-6 months, pulm/vascular/PCP followup and pulse ox.
Seen on rounds.  Comfortable , mild pleuritic chest pain, breathing easily.  Ct results reviewed.  Plans as outlined above.  Should be able to switch to DOAC in am. Continuing RX for COVID.
Feels better, on NP.  exam unchanged.  Echo no evidence of right heart dysfunction.  Now on DOAC, to continue COVID care.  For transfer to the floor.

## 2021-02-05 NOTE — DISCHARGE NOTE NURSING/CASE MANAGEMENT/SOCIAL WORK - CAREGIVER PHONE NUMBER
This patient is a [de-identified] y o  y/o female who is admitted to the hospital for Unable to speak (Per EMS patient d/c from [address] rehab  Family noted that patient was having a hard time getting her words out  Patient offers no verbal c/c at this time  )       The patient presented to the ED on 4/16/18 at 1246 and was admitted to the hospital on 4/16/2018 1246  History of Present Illness includes: Germania Gallegos is a [de-identified] y o  female with PMH notable for IDDM2, AS s/p TAVR 2 week ago, CAD, bilateral internal carotid stenosis who presents with a several day history of expressive aphasia  She states that she does not notice this, but her family does  She is very slow with her words  It was very noticeable to her yesterday when she tried to state the name of a type of flower and could not 'get the word out'  This has never happened to her before  She denies weakness or paresthesias  She denies difficulty with gait or coordination      She was discharged from inpatient rehabilitation within the last 48 hours and has no documented aphasia from that inpatient stay  Vital signs in the ER are as follows ED Triage Vitals   Temperature Pulse Respirations Blood Pressure SpO2   04/16/18 2020 04/16/18 1300 04/16/18 1300 04/16/18 1300 04/16/18 1300   98 9 °F (37 2 °C) (!) 107 20 139/66 98 %      Temp Source Heart Rate Source Patient Position - Orthostatic VS BP Location FiO2 (%)   04/16/18 2020 04/16/18 1300 04/16/18 1300 04/16/18 1300 --   Oral Monitor Lying Right arm       Pain Score       04/16/18 1300       No Pain             Treatment in the ER included: basic labs, imaging      CT head without contrast   Final Result by Dyllan Bird DO (04/16 8056)       No acute intracranial abnormality          Age related atrophy and findings most compatible with chronic small vessel ischemic disease                        Workstation performed: [id number]           XR chest 2 views   Final Result by Eladia Escalante DO (04/16 1536)       No acute cardiopulmonary disease  The patient is admitted as INPATIENT  and has remained hospitalized for 1 day(s)  Last vital signs were Blood pressure 113/68, pulse 83, temperature 98 9 °F (37 2 °C), temperature source Oral, resp  rate 18, height 5' 4" (1 626 m), weight 76 2 kg (167 lb 15 9 oz), SpO2 99 %, not currently breastfeeding  Treatment includes: tele, cardiology and neurology consult, MRI brain, neuro checks Q4hrs, ROBB and PT/OT consult  Results include:       0  Lab Value Date/Time   TROPONINI 0 06 (H) 04/16/2018 1326   TROPONINI 2 02 (H) 03/16/2018 0508   TROPONINI 2 31 (H) 03/16/2018 0208   TROPONINI 2 20 (H) 03/15/2018 2302   TROPONINI 1 98 (H) 03/15/2018 1938   TROPONINI 1 61 (H) 03/15/2018 1629   TROPONINI 0 56 (H) 03/15/2018 1331   TROPONINI 2 38 (H) 12/20/2017 0806   TROPONINI 2 85 (H) 12/20/2017 0537   TROPONINI 2 88 (H) 12/20/2017 0537   TROPONINI 1 34 (H) 12/19/2017 2141   TROPONINI 0 43 (H) 12/19/2017 1655   TROPONINI 0 02 12/19/2017 1424                 Results from last 7 days  Lab Units 04/17/18  0447 04/16/18  1326   WBC Thousand/uL 5 26 6 10   HEMOGLOBIN g/dL 9 3* 10 6*   HEMATOCRIT % 29 9* 33 7*   PLATELETS Thousands/uL 140* 172         Results from last 7 days  Lab Units 04/17/18  0447 04/16/18  1326   SODIUM mmol/L 139 138   POTASSIUM mmol/L 3 8 4 5   CHLORIDE mmol/L 107 103   CO2 mmol/L 23 24   BUN mg/dL 36* 47*   CREATININE mg/dL 1 07 1 48*   GLUCOSE RANDOM mg/dL 80 105   CALCIUM mg/dL 6 6* 7 1       MRI brain: 1   4 punctate foci of diffusion restriction indicative of scattered tiny acute/recent lacunar infarctions in 4 distinct vascular territories implicating a central embolic source   Does the patient have atrial fibrillation? 2   Advanced, chronic microangiopathy  The patient is appropriate for  Inpatient Admission  The rationale is as follows: The patient is a [de-identified] y/o female who presented with expressive aphasia    She required tele, cardiology and neurology consult, MRI brain, neuro checks Q4hrs, ROBB and PT/OT consult  There is documentation by the admitting physician that the patient would require greater than two midnight stay based on medical necessity  Hospitalization is necessary to prevent further deterioration of her clinical condition  After review of the medical chart, labs, imaging, and notes - I agree that the patient meets criteria for INPATIENT ADMISSION  The patient had a hospitalization within the last 30 days - after reviewing the chart from that admission it was determined that the two admissions were for the different conditions  Previous admission was for aortic stenosis s o TAVR  Current admission for acute CVA - no symptoms at time of discharge  These two admissions are therefore unrelated  [phone], [phone](zdwo)

## 2021-02-05 NOTE — PROGRESS NOTE ADULT - SUBJECTIVE AND OBJECTIVE BOX
INTERVAL HPI/OVERNIGHT EVENTS:    SUBJECTIVE: Patient seen and examined at bedside.    OBJECTIVE:    VITAL SIGNS:  ICU Vital Signs Last 24 Hrs  T(C): 36.3 (05 Feb 2021 05:40), Max: 37.2 (04 Feb 2021 12:15)  T(F): 97.4 (05 Feb 2021 05:40), Max: 98.9 (04 Feb 2021 12:15)  HR: 58 (05 Feb 2021 05:05) (58 - 88)  BP: 125/84 (05 Feb 2021 05:05) (122/71 - 140/71)  BP(mean): 92 (05 Feb 2021 05:05) (92 - 92)  ABP: --  ABP(mean): --  RR: 18 (05 Feb 2021 05:05) (18 - 20)  SpO2: 98% (05 Feb 2021 05:05) (94% - 98%)        02-03 @ 07:01  -  02-04 @ 07:00  --------------------------------------------------------  IN: 720 mL / OUT: 900 mL / NET: -180 mL    02-04 @ 07:01  -  02-05 @ 06:55  --------------------------------------------------------  IN: 1560 mL / OUT: 1300 mL / NET: 260 mL      CAPILLARY BLOOD GLUCOSE      POCT Blood Glucose.: 246 mg/dL (03 Feb 2021 11:36)      PHYSICAL EXAM:        MEDICATIONS:  MEDICATIONS  (STANDING):  apixaban 10 milliGRAM(s) Oral every 12 hours  dexAMETHasone  Injectable 6 milliGRAM(s) IV Push every 24 hours  latanoprost 0.005% Ophthalmic Solution 1 Drop(s) Both EYES at bedtime  polyethylene glycol 3350 17 Gram(s) Oral every 24 hours  senna 2 Tablet(s) Oral at bedtime  timolol 0.5% Solution 1 Drop(s) Both EYES every 24 hours    MEDICATIONS  (PRN):  acetaminophen   Tablet .. 650 milliGRAM(s) Oral every 6 hours PRN Temp greater or equal to 38C (100.4F)  benzonatate 100 milliGRAM(s) Oral three times a day PRN Cough      ALLERGIES:  Allergies    No Known Allergies    Intolerances        LABS:                        11.5   11.05 )-----------( 243      ( 04 Feb 2021 07:48 )             38.6     02-04    141  |  102  |  22  ----------------------------<  168<H>  4.4   |  31  |  0.89    Ca    8.7      04 Feb 2021 07:48  Phos  3.7     02-04  Mg     2.6     02-04    TPro  6.5  /  Alb  3.2<L>  /  TBili  0.4  /  DBili  x   /  AST  87<H>  /  ALT  73<H>  /  AlkPhos  76  02-04    PTT - ( 03 Feb 2021 16:57 )  PTT:150.7 sec      RADIOLOGY & ADDITIONAL TESTS: Reviewed. INTERVAL HPI/OVERNIGHT EVENTS:  BRIANA.    SUBJECTIVE: Patient seen and examined at bedside. Notes chronic chest discomfort. Other ROS negative- denies chest pain, SOB, nausea, vomiting, abdominal pain, leg pain, diarrhea, constipation, urinary changes. Weaned from 3L NC to room air.    OBJECTIVE:    VITAL SIGNS:  ICU Vital Signs Last 24 Hrs  T(C): 36.3 (05 Feb 2021 05:40), Max: 37.2 (04 Feb 2021 12:15)  T(F): 97.4 (05 Feb 2021 05:40), Max: 98.9 (04 Feb 2021 12:15)  HR: 58 (05 Feb 2021 05:05) (58 - 88)  BP: 125/84 (05 Feb 2021 05:05) (122/71 - 140/71)  BP(mean): 92 (05 Feb 2021 05:05) (92 - 92)  ABP: --  ABP(mean): --  RR: 18 (05 Feb 2021 05:05) (18 - 20)  SpO2: 98% (05 Feb 2021 05:05) (94% - 98%)        02-03 @ 07:01  -  02-04 @ 07:00  --------------------------------------------------------  IN: 720 mL / OUT: 900 mL / NET: -180 mL    02-04 @ 07:01  - 02-05 @ 06:55  --------------------------------------------------------  IN: 1560 mL / OUT: 1300 mL / NET: 260 mL      CAPILLARY BLOOD GLUCOSE      POCT Blood Glucose.: 246 mg/dL (03 Feb 2021 11:36)      PHYSICAL EXAM:  Constitutional: WDWN, NAD, lying comfortably in bed, on room air  HEENT: EOMI   Respiratory: CTA b/l  Cardiovascular: RRR, normal S1S2, no M/R/G appreciated  Gastrointestinal: abd soft, nontender, nondistended   Extremities: legs warm, well perfused   Neurological: AAOx3       MEDICATIONS:  MEDICATIONS  (STANDING):  apixaban 10 milliGRAM(s) Oral every 12 hours  dexAMETHasone  Injectable 6 milliGRAM(s) IV Push every 24 hours  latanoprost 0.005% Ophthalmic Solution 1 Drop(s) Both EYES at bedtime  polyethylene glycol 3350 17 Gram(s) Oral every 24 hours  senna 2 Tablet(s) Oral at bedtime  timolol 0.5% Solution 1 Drop(s) Both EYES every 24 hours    MEDICATIONS  (PRN):  acetaminophen   Tablet .. 650 milliGRAM(s) Oral every 6 hours PRN Temp greater or equal to 38C (100.4F)  benzonatate 100 milliGRAM(s) Oral three times a day PRN Cough      ALLERGIES:  Allergies    No Known Allergies    Intolerances        LABS:                        11.5   11.05 )-----------( 243      ( 04 Feb 2021 07:48 )             38.6     02-04    141  |  102  |  22  ----------------------------<  168<H>  4.4   |  31  |  0.89    Ca    8.7      04 Feb 2021 07:48  Phos  3.7     02-04  Mg     2.6     02-04    TPro  6.5  /  Alb  3.2<L>  /  TBili  0.4  /  DBili  x   /  AST  87<H>  /  ALT  73<H>  /  AlkPhos  76  02-04    PTT - ( 03 Feb 2021 16:57 )  PTT:150.7 sec      RADIOLOGY & ADDITIONAL TESTS: Reviewed.

## 2021-02-05 NOTE — DISCHARGE NOTE NURSING/CASE MANAGEMENT/SOCIAL WORK - PATIENT PORTAL LINK FT
You can access the FollowMyHealth Patient Portal offered by [facility] by registering at the following website: [email or web address]. By joining [employer]’s FollowMyHealth portal, you will also be able to view your health information using other applications (apps) compatible with our system.

## 2021-02-08 ENCOUNTER — NON-APPOINTMENT (OUTPATIENT)
Age: 51
End: 2021-02-08

## 2021-02-10 DIAGNOSIS — I26.99 OTHER PULMONARY EMBOLISM WITHOUT ACUTE COR PULMONALE: ICD-10-CM

## 2021-02-10 DIAGNOSIS — D64.9 ANEMIA, UNSPECIFIED: ICD-10-CM

## 2021-02-10 DIAGNOSIS — D72.829 ELEVATED WHITE BLOOD CELL COUNT, UNSPECIFIED: ICD-10-CM

## 2021-02-10 DIAGNOSIS — J12.82 PNEUMONIA DUE TO CORONAVIRUS DISEASE 2019: ICD-10-CM
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
## 2021-02-10 DIAGNOSIS — U07.1 COVID-19: ICD-10-CM

## 2021-02-10 DIAGNOSIS — E66.9 OBESITY, UNSPECIFIED: ICD-10-CM

## 2021-02-10 DIAGNOSIS — I26.94 MULTIPLE SUBSEGMENTAL PULMONARY EMBOLI WITHOUT ACUTE COR PULMONALE: ICD-10-CM

## 2021-02-10 DIAGNOSIS — J96.01 ACUTE RESPIRATORY FAILURE WITH HYPOXIA: ICD-10-CM

## 2021-02-10 DIAGNOSIS — T38.0X5A ADVERSE EFFECT OF GLUCOCORTICOIDS AND SYNTHETIC ANALOGUES, INITIAL ENCOUNTER: ICD-10-CM

## 2021-02-10 DIAGNOSIS — H40.9 UNSPECIFIED GLAUCOMA: ICD-10-CM

## 2021-02-10 DIAGNOSIS — R79.1 ABNORMAL COAGULATION PROFILE: ICD-10-CM

## 2021-02-12 ENCOUNTER — APPOINTMENT (OUTPATIENT)
Dept: PULMONOLOGY | Facility: CLINIC | Age: 51
End: 2021-02-12
Payer: COMMERCIAL

## 2021-02-12 PROCEDURE — 99214 OFFICE O/P EST MOD 30 MIN: CPT | Mod: 95

## 2021-02-12 NOTE — REASON FOR VISIT
[Home] : at home, [unfilled] , at the time of the visit. [Medical Office: ([facility])___] : at the medical office located in  [Follow-Up] : a follow-up visit [TextBox_44] : covid

## 2021-02-12 NOTE — HISTORY OF PRESENT ILLNESS
[Never] : never [TextBox_4] : he is doing well.  he is sleeping well.  his appetite is good.  The taste is not back to normal.  He is off the oxygen.  He is off the Decadron.  He is still on the apixaban .  The resting pulse is 80 and when he goes up the stairs is 105 but used to be 150.  The oxygen sat 96% at rest but does not drop with exertions but did drop first when he got home

## 2021-02-12 NOTE — ASSESSMENT
[FreeTextEntry1] : The patient was admitted with hypoxic respiratory failure secondary to Covid infection Covid pneumonia. The patient was treated with remdesivir and Decadron and DVT prophylaxis. The patient was discharged on Decadron when he finished a call course, oxygen supplementation and he is now off oxygen. Patient is on DVT prophylaxis for the 30 days. No evidence of thromboembolic disease.\par \par The patient clinically improved and is off oxygen, the baseline oxygen saturation in is stable at rest and with exertion. The heart rate even stable since he was discharged. The patient is to finish the 30 days of anticoagulation for DVT prophylaxis.\par \par I reviewed the CT scan of the chest and will follow up in the office in 1 months to confirm the resolution of the groundglass opacities. The patient will require PFT\par \par Pulmonary emboli the patient has provoked pulmonary emboli secondary to Covid.  The patient is on anticoagulation.  There is no evidence neither of failure of therapy nor bleeding.  We will follow-up in the office

## 2021-02-16 ENCOUNTER — APPOINTMENT (OUTPATIENT)
Dept: VASCULAR SURGERY | Facility: CLINIC | Age: 51
End: 2021-02-16
Payer: COMMERCIAL

## 2021-02-16 PROCEDURE — 99241 OFFICE CONSULTATION NEW/ESTAB PATIENT 15 MIN: CPT | Mod: GT

## 2021-02-18 NOTE — PHYSICAL EXAM
[Alert] : alert [Oriented to Person] : oriented to person [Oriented to Place] : oriented to place [Oriented to Time] : oriented to time [Calm] : calm [Ankle Swelling (On Exam)] : not present [de-identified] : well appearing over camera

## 2021-02-18 NOTE — HISTORY OF PRESENT ILLNESS
[FreeTextEntry1] : Verbal consent for telehealth services was obtained from the patient. Visit provided using real-time telehealth services with two-way video and audio platform.\par \par The location of the patient: Home\par The location of the Provider: [address] [place] [address]\par \par Persons participating in the telehealth service and their role in the encounter:\par Patient: Nick Palomares\par Physician: Dr. Gadiel Velasquez and Kinza Rivera PA-C\par Time spent: 15 minutes \par \par 50 year old male who was admitted to the hospital  2/1/21-2/5/21 with SOB and found to have COVID PNA. while he was here he became hypoxic and was found to have submassive PE with RHS on CT scan. US the day before did not show acute DVT/SVT bilaterally. He was stable and discharged on Eliquis 5mg BID, he is following up with Dr. Julian. He continues to have some mild SOB with exertion and still has loss of taste and smell. He denies any swelling or pain in the legs. No personal or family hx of DVT.

## 2021-02-18 NOTE — REVIEW OF SYSTEMS
[SOB on Exertion] : shortness of breath during exertion [Negative] : Heme/Lymph [FreeTextEntry2] : loss of taste and swell

## 2021-02-18 NOTE — ASSESSMENT
[FreeTextEntry1] : 50 year old male who was admitted to the hospital  2/1/21-2/5/21 with SOB and found to have COVID PNA. while he was here he became hypoxic and was found to have submassive PE with RHS on CT scan. US the day before did not show acute DVT/SVT bilaterally. He was stable and discharged on Eliquis 5mg BID\par \par Plan:\par - Continue Eliquis per Dr. Julian\par - Will call or come to the ED should he experience any worsening symptoms (SOB, leg pain or swelling)\par - FU here as needed.

## 2021-04-06 ENCOUNTER — APPOINTMENT (OUTPATIENT)
Dept: INTERNAL MEDICINE | Facility: CLINIC | Age: 51
End: 2021-04-06
Payer: COMMERCIAL

## 2021-04-06 VITALS
DIASTOLIC BLOOD PRESSURE: 80 MMHG | SYSTOLIC BLOOD PRESSURE: 128 MMHG | TEMPERATURE: 97 F | WEIGHT: 204 LBS | HEART RATE: 80 BPM | HEIGHT: 70 IN | OXYGEN SATURATION: 97 % | BODY MASS INDEX: 29.2 KG/M2

## 2021-04-06 DIAGNOSIS — R73.9 HYPERGLYCEMIA, UNSPECIFIED: ICD-10-CM

## 2021-04-06 DIAGNOSIS — Z87.01 PERSONAL HISTORY OF PNEUMONIA (RECURRENT): ICD-10-CM

## 2021-04-06 DIAGNOSIS — J96.01 ACUTE RESPIRATORY FAILURE WITH HYPOXIA: ICD-10-CM

## 2021-04-06 DIAGNOSIS — Z20.822 CONTACT WITH AND (SUSPECTED) EXPOSURE TO COVID-19: ICD-10-CM

## 2021-04-06 DIAGNOSIS — Z11.59 ENCOUNTER FOR SCREENING FOR OTHER VIRAL DISEASES: ICD-10-CM

## 2021-04-06 PROCEDURE — 99072 ADDL SUPL MATRL&STAF TM PHE: CPT

## 2021-04-06 PROCEDURE — 99214 OFFICE O/P EST MOD 30 MIN: CPT | Mod: 25

## 2021-04-06 PROCEDURE — 36415 COLL VENOUS BLD VENIPUNCTURE: CPT

## 2021-04-16 LAB
ALBUMIN SERPL ELPH-MCNC: 4.3 G/DL
ALP BLD-CCNC: 79 U/L
ALT SERPL-CCNC: 18 U/L
ANION GAP SERPL CALC-SCNC: 9 MMOL/L
AST SERPL-CCNC: 19 U/L
BASOPHILS # BLD AUTO: 0.07 K/UL
BASOPHILS NFR BLD AUTO: 1.2 %
BILIRUB SERPL-MCNC: 0.4 MG/DL
BUN SERPL-MCNC: 16 MG/DL
CALCIUM SERPL-MCNC: 9.3 MG/DL
CHLORIDE SERPL-SCNC: 108 MMOL/L
CHOLEST SERPL-MCNC: 238 MG/DL
CO2 SERPL-SCNC: 24 MMOL/L
COVID-19 NUCLEOCAPSID  GAM ANTIBODY INTERPRETATION: POSITIVE
CREAT SERPL-MCNC: 0.94 MG/DL
DEPRECATED D DIMER PPP IA-ACNC: <150 NG/ML DDU
EOSINOPHIL # BLD AUTO: 0.33 K/UL
EOSINOPHIL NFR BLD AUTO: 5.5 %
ESTIMATED AVERAGE GLUCOSE: 148 MG/DL
FACT VIII ACT/NOR PPP: 91 %
GLUCOSE SERPL-MCNC: 109 MG/DL
HBA1C MFR BLD HPLC: 6.8 %
HCT VFR BLD CALC: 44.9 %
HDLC SERPL-MCNC: 52 MG/DL
HGB BLD-MCNC: 13.1 G/DL
IMM GRANULOCYTES NFR BLD AUTO: 0.3 %
LDLC SERPL CALC-MCNC: 172 MG/DL
LYMPHOCYTES # BLD AUTO: 2.24 K/UL
LYMPHOCYTES NFR BLD AUTO: 37.2 %
MAN DIFF?: NORMAL
MCHC RBC-ENTMCNC: 25.1 PG
MCHC RBC-ENTMCNC: 29.2 GM/DL
MCV RBC AUTO: 86 FL
MONOCYTES # BLD AUTO: 0.59 K/UL
MONOCYTES NFR BLD AUTO: 9.8 %
NEUTROPHILS # BLD AUTO: 2.77 K/UL
NEUTROPHILS NFR BLD AUTO: 46 %
NONHDLC SERPL-MCNC: 185 MG/DL
NT-PROBNP SERPL-MCNC: 9 PG/ML
PLATELET # BLD AUTO: 287 K/UL
POTASSIUM SERPL-SCNC: 3.9 MMOL/L
PROT SERPL-MCNC: 6.9 G/DL
RBC # BLD: 5.22 M/UL
RBC # FLD: 15.5 %
SARS-COV-2 AB SERPL QL IA: 174 INDEX
SODIUM SERPL-SCNC: 141 MMOL/L
TRIGL SERPL-MCNC: 66 MG/DL
WBC # FLD AUTO: 6.02 K/UL

## 2021-06-07 ENCOUNTER — APPOINTMENT (OUTPATIENT)
Dept: INTERNAL MEDICINE | Facility: CLINIC | Age: 51
End: 2021-06-07
Payer: COMMERCIAL

## 2021-06-07 VITALS
SYSTOLIC BLOOD PRESSURE: 143 MMHG | WEIGHT: 214 LBS | RESPIRATION RATE: 17 BRPM | DIASTOLIC BLOOD PRESSURE: 84 MMHG | BODY MASS INDEX: 29.96 KG/M2 | HEART RATE: 70 BPM | TEMPERATURE: 97 F | HEIGHT: 70.87 IN | OXYGEN SATURATION: 98 %

## 2021-06-07 VITALS — DIASTOLIC BLOOD PRESSURE: 78 MMHG | SYSTOLIC BLOOD PRESSURE: 128 MMHG

## 2021-06-07 PROCEDURE — 99214 OFFICE O/P EST MOD 30 MIN: CPT

## 2021-06-07 PROCEDURE — 99072 ADDL SUPL MATRL&STAF TM PHE: CPT

## 2021-06-07 NOTE — HISTORY OF PRESENT ILLNESS
[FreeTextEntry1] : Follow-up for DVT PE related to COVID and type II diabetes [de-identified] : Patient is a 51-year-old male with history of acute pulmonary embolism secondary to covid in February infection, BPH, type II diabetes who presents for follow-up patient feels well denies charge chest pain, shortness of breath palpitation leg edema pain continue to take eliquis 5 mg BID. Denies bruising, bleeding, polyuria polydipsia has gained 10 pounds since last visit. Denies polyuria polydipsia cold or heat intolerance

## 2021-06-07 NOTE — ASSESSMENT
[FreeTextEntry1] : Patient is a 51-year-old male with history of pulmonary embolism related to covid infection status post hospitalization, overweight, type II diabetes, BPH who presents for follow-up of pulmonary embolism and prediabetes\par \par 1. Acute pulmonary embolism related to covert infection\par continue eliquis 5 mg BID for total six months however use prophylactic dose 2.5 BID for another six months\par patient asymptomatic at present time normal D dimer is\par \par 2 overweight\par gain 10 pounds counseled on diet and exercise\par \par 3 prediabetes\par last hemoglobin A1c 6.8 actual diabetes\par will check next visit concern regarding weight gain\par if elevated will consider medication\par \par 4 history of covid  infection\par status post vaccine\par follow-up in two months.\par

## 2021-06-07 NOTE — HISTORY OF PRESENT ILLNESS
[FreeTextEntry1] : Follow-up for COVID-19 infection and pulmonary embolism [de-identified] : \par The patient is a 50-year-old male with history of BPH, overweight, seasonal allergies, who presented in January with fever chills consistent with COVID pneumonia noted to have hypoxia went to the hospital CT showed bilateral ground glass infiltrates CT Yamilet showed bilateral DVTs patient was treated with steroids, anticoagulation and rendezavir. Patient was discharged for follow-up patient feels well denies chest pain, shortness of breath palpitation lightheadedness fever chills. Lost about 10 pounds since hospital.

## 2021-06-07 NOTE — ASSESSMENT
[FreeTextEntry1] : Patient is a 50-year-old male with history of overweight, seasonal allergies, who presented to [facility] with COVID-19 pneumonia and pulmonary embolism status post steroids, medication and anticoagulation who presents for follow-up\par \par 1. Pulmonary embolism\par on eliquis continue 3 months\par check D dimer is BNP factor eight\par follow-up in one to two months\par \par 2. COVID-19 infection\par had transaminases elevated sugars\par repeat CBC can profile\par check over antibodies\par patient should get vaccine\par \par 3 hypoglycemia\par check hemoglobin A1c glucose\par \par 4. History of COVID pneumonia respiratory failure\par complete resolved normal oxygen saturation 98\par \par 5 history of elevated hyperlipidemia\par check lipid profile

## 2021-07-30 ENCOUNTER — APPOINTMENT (OUTPATIENT)
Dept: OTOLARYNGOLOGY | Facility: CLINIC | Age: 51
End: 2021-07-30
Payer: COMMERCIAL

## 2021-07-30 VITALS
DIASTOLIC BLOOD PRESSURE: 84 MMHG | WEIGHT: 210 LBS | HEIGHT: 71 IN | OXYGEN SATURATION: 98 % | TEMPERATURE: 98 F | BODY MASS INDEX: 29.4 KG/M2 | SYSTOLIC BLOOD PRESSURE: 131 MMHG | HEART RATE: 83 BPM

## 2021-07-30 DIAGNOSIS — J34.3 HYPERTROPHY OF NASAL TURBINATES: ICD-10-CM

## 2021-07-30 DIAGNOSIS — H61.23 IMPACTED CERUMEN, BILATERAL: ICD-10-CM

## 2021-07-30 DIAGNOSIS — T48.5X5A CHRONIC RHINITIS: ICD-10-CM

## 2021-07-30 DIAGNOSIS — J31.0 CHRONIC RHINITIS: ICD-10-CM

## 2021-07-30 PROCEDURE — 69210 REMOVE IMPACTED EAR WAX UNI: CPT

## 2021-07-30 PROCEDURE — 99203 OFFICE O/P NEW LOW 30 MIN: CPT | Mod: 25

## 2021-07-30 NOTE — PHYSICAL EXAM
[Midline] : trachea located in midline position [Normal] : no rashes [de-identified] : significant turbinate hypertrophy bilaterally [de-identified] : Significant cerumen impaction bilaterally, cleared away without issue

## 2021-07-30 NOTE — ASSESSMENT
[FreeTextEntry1] : 50 yo male who presents with concern for hearing loss.  On exam found to have cerumen impaction.  This was cleaned away and the patient noted resolution of symptoms.  He can use debrox PRN and follow up PRN regarding his ears.\par \par On exam evidence of turbinate hypertrophy in setting of oxymetazoline use.  I discussed avoiding that medication, and using nasal saline and nasal steroids for the next 6-8 weeks.  Follow up in 2 months if still symptomatic.\par \par - cerumen cleaned without issue\par - debrox prn\par - avoid oxymetazoline\par - nasal saline, nasal steroids\par - fu 2 mo if still symptomatic.

## 2021-07-30 NOTE — HISTORY OF PRESENT ILLNESS
[de-identified] : 50 yo male who presents with concern for hearing loss.  Notes he is have asking "what?" more frequently.  Believes his hearing is decreased.  Seen by PCP and noted to have cerumen impaction.  Denies any tinnitus, otorrhea, otalgia, vertiginous symptoms.  \par \par He does have nasal congestion at night and will frequently use [employer] brand oxymetazoline.  No facial pain, change in smell, taste, dental pain rhinorrhea.   No ENT issues otherwise.

## 2021-08-23 ENCOUNTER — APPOINTMENT (OUTPATIENT)
Dept: INTERNAL MEDICINE | Facility: CLINIC | Age: 51
End: 2021-08-23

## 2021-09-28 NOTE — ED ADULT TRIAGE NOTE - ISOLATION INDICATION AIRBORNE CONTACT
[facility] Medicine History and Physical        Date of Admission:  9/27/2021  Date of Service: 9/27/2021         HPI      Chief Complaint   Foot infection    History is obtained from the patient, the patient's sister, and the electronic medical record.    History of Present Illness   Son is a 52 year old male who has a history of chronic left foot wound (onset 1/24/21, prior GAS SSTI), PAD, DMT2 (complicated by diabetic neuropathy), AUD, and HTN.  He presents today for further evaluation and management of his chronic left foot wound.     DOI: 1/24/21  Mechanism: LLE diabetic neuropathy, was applying parking break of his truck 1/20.  Foot slipped off the brake and patient took abrasion to medial ankle.  It is unclear if this injury precipitated the wound or if it was the first time the patient noted a previously existing eschar in that area.      Clinical course: Was seen in [place] and ultimately transferred to [facility] in [place] where on 1/24 he was found to have sepsis secondary to cellulitis along with bacteremia. The left lower extremity infection included myonecrosis of soleus muscle with cellulitis and eschar of the left medial ankle. They noted GAS from the leg drainage.    Patient was readmitted 2/5-2/7 with concern for wound infection.  At that time podiatry discussed options of amputation versus wound care.  Patient elected wound care.  He was seen by podiatry and vascular surgery during that hospitalization.  Podiatry performed bedside drainage with D-parminder of superficial skin blistering. They did not recommend an incision with concern for wound healing. Arterial duplex left lower extremity showed distal stenosis and occlusion. Interventional radiology performed a left lower extremity angiogram with balloon angioplasty to the proximal and mid left anterior tibial artery with improved flow. The left distal posterior tibial artery  failed recanalization. He has been non weightbearing since 2/5.  He had a PICC placed and home health visits for delivery of IV abx as well as dressing changes.  He  was followed by infectious disease clinic through [facility] in [facility].  He completed a course of IV ceftriaxone 2 g IV daily from 2/5-3/17.  ID and podiatry followed him during that time.    He presents today at the recommendation of his vascular surgeon, Dr. Trotter, who has been following the patient up in [place].     He first followed with this provider in March 2021 and initially had irrigation and debridement of the wound with drainage of abscess at the posterior aspect of the ankle with subsequent wet-to-dry dressing changes to achieve additional debridement.  Patient was later transitioned to Endofoam and a wound VAC to stimulate tissue growth.  At that time patient was recommended to be seen on a weekly basis after wound VAC placement however he was not able to adhere with this plan.      He next presented to Dr. Trotter on 8/4. At that time patient's provider noted all previously exposed bone was covered by granulation tissue.  However a large area of Achilles tendon was still exposed.  He was recommended to see a plastic surgeon, Dr. Di Rick, in consideration of a flap to close the area and preserve the tendon.  Patient was then lost to follow-up until today 9/27.     He presented given concern for wound drainage and desire to start ambulating again.  Patient endorsed being nonweightbearing, using knee roller, and using endoform and negative pressure with a wound VAC on the site since he was last seen.  His provider today probed the wound found large amount of venous blood, foul-smelling material, necrotic material with heavy drainage.    A wound culture was obtained from the site.  After contacting Dr. Trotter this evening, he intends to page the [facility] hospitalist team once those wound culture results become  available, he denied existence of any current blood cultures outside of [facility].    (It is unclear where the ED provider today noted blood cultures.  They are unavailable to discuss.)     Overall Son states his foot feels fine.  No pain in the foot currently, sometimes 1/10 pain when the wound vac is in place when changing positions in bed.  He states he has been taking Levaquin to address a skin infection.  He does not recall if he was taking Keflex.      He states this past week he had 2 days worth of fever and chills, subjective warm, itchy throat and cough that resolved with minimal intervention.  He has not otherwise felt feverish, dizzy, temperature fluctuation. His sister notes he has not been eating well for the last several months with minimal intake.  He denies abdominal pain issues with his voids or stools.  He has no issues with nausea or vomiting.    Functionally he uses a walker and a scooter to get around. Goes too fast on crutches, can't use those.  His home has handicap access and thus he does not have to use stairs.     He is currently only followed by vascular.  He was to be seen by Dr. Murrell with plastics at the  however he was unable to set that appointment up. His ultimate goal is limb preservation.      ED Course:     Vitals: T97.7F, , BP 93//59, R12, 93% RRA, Weight 157lb  Intermittently tachycardic in the ER with HR max 111, he is remained normotensive and otherwise hemodynamically stable  Labs: CMP with creatinine 1.75, EGFR 44 Albumin 2.4 AST 98, CRP 31 CBC with normal white count at 7.1, hemoglobin 12.9 with MCV of 93 platelets are normal, sed rate 54 blood cultures are drawn and are pending  Interventions: 1 L normal saline bolus followed by infusion of normal saline at 125 mL's per hour for the last 3 hours.  Zosyn 4.5 g IV and vancomycin were started  Consultations: Orthopedic surgery evaluated the patient in the ER.  They noted no current indication for operative intervention  from their perspective.  They recommend continued nonweightbearing status of the left lower extremity, a WOC consultation, as well as daily dressing and packing changes.  Appreciate recs.           History:   Review of Systems   The 10 point Review of Systems is negative other than noted in the HPI or here.   Review of Systems   Constitutional: Positive for chills.   Respiratory: Negative.    Cardiovascular: Negative.  Negative for chest pain and leg swelling.   Gastrointestinal: Negative for abdominal pain, blood in stool and constipation.   Musculoskeletal: Positive for gait problem. Negative for myalgias.   Skin: Positive for wound.   All other systems reviewed and are negative.      Past Medical History    I have reviewed this patient's medical history and updated it with pertinent information if needed.   History reviewed. No pertinent past medical history.   HTN, T2DM, peripheral arterial disease, Hx moderate protein calorie malnutrition    Past Surgical History   I have reviewed this patient's surgical history and updated it with pertinent information if needed.  History reviewed. No pertinent surgical history.   Revascularization procedure    Social History   Social History     Tobacco Use     Smoking status: Never Smoker     Smokeless tobacco: Never Used   Substance Use Topics     Alcohol use: Yes     Drug use: Not Currently   Patient lives in [place].  He endorses years worth of daily alcohol use.  After illness in January he states he stopped drinking, he previously had been taking 1-1/2 pints of peppermint schnapps daily.  He states after quitting cold turkey he does not recall experiencing symptoms, he did not experience neurologic sequela from withdrawal    Last month, early/mid August patient states he resumed use of peppermint shops daily.  His last drink was 9/24.  He is interested in antibuse or an alternative medication to help with alcohol use.     He used to work in [email or web address], had to file for medical assistnce, sohrt term disabli which  from end of July till now. Lives in house. 2 boys 13 and 8. Brother sometimes, mom sometimes lives there. Handicap access in the home.     Family History   I have reviewed this patient's family history and updated it with pertinent information if needed.   History reviewed. No pertinent family history.    Prior to Admission Medications   Prior to Admission Medications   Prescriptions Last Dose Informant Patient Reported? Taking?   Wound Cleansers (VASHE WOUND THERAPY) SOLN 2021 at Unknown time Self Yes Yes   Sig: USE 3 TIMES DAILY FOR WET OR DRY DRESSING CHANGES AS DIRECTED   cephALEXin (KEFLEX) 500 MG capsule 2021 at Unknown time Self Yes Yes   Sig: Take 500 mg by mouth 2 times daily   levofloxacin (LEVAQUIN) 750 MG tablet 2021 at Unknown time Self Yes Yes   Sig: Take 750 mg by mouth daily      Facility-Administered Medications: None     Allergies   No Known Allergies        Physical Exam      Vital Signs: Temp: 98.4  F (36.9  C) Temp src: Oral BP: (!) 171/96 Pulse: 105   Resp: 16 SpO2: 99 % O2 Device: None (Room air)    Weight: 167 lbs 3.2 oz    Physical Exam  Vitals and nursing note reviewed.   Constitutional:       General: He is not in acute distress.     Appearance: He is not ill-appearing.      Comments: Thin appearing   HENT:      Head: Normocephalic and atraumatic.      Right Ear: External ear normal.      Left Ear: External ear normal.   Eyes:      General: No scleral icterus.     Extraocular Movements: Extraocular movements intact.      Conjunctiva/sclera: Conjunctivae normal.   Cardiovascular:      Rate and Rhythm: Normal rate and regular rhythm.      Pulses: Normal pulses.      Heart sounds: Normal heart sounds. No murmur heard.     Pulmonary:      Effort: Pulmonary effort is normal. No respiratory distress.      Breath sounds: Normal breath sounds. No wheezing or rhonchi.   Musculoskeletal:      Cervical  back: Normal range of motion.      Right lower leg: No edema.      Left lower leg: No edema.   Skin:     General: Skin is warm and dry.      Capillary Refill: Capillary refill takes less than 2 seconds.      Comments: See below image, skin otherwise warm and dry.   Neurological:      General: No focal deficit present.      Mental Status: He is alert. Mental status is at baseline.      Comments: Observed orthopedic resident as he performed sensory screening of patient's left lower extremity sensation while patient blinded.  Patient had inconsistent responses but 1 unable to see the exam lack of sensation DPN distribution.       Psychiatric:         Mood and Affect: Mood normal.         Behavior: Behavior normal.                 Assessment & Plan      Son is a 52 year old male admitted on 9/27/2021. He has a history of chronic left foot wound (onset 1/24/21, prior GAS SSTI), PAD, DMT2 (complicated by diabetic neuropathy), AUD, and HTN.  He is admitted for further evaluation and management of suspected diabetic foot wound infection with possible osteomyelitis and additionally  observing for withdrawal.  He is presently stable.    #Chronic diabetic foot wound/infection  Concern for superimposed infection of soft tissues and possibly osteomyelitis.  Patient does not currently meet SIRS criteria.  He remains on IV vancomycin and Zosyn.  Wound culture obtained from wound site prior to initiation of broader antibiotics (patient previously on PO linezolid alone) pending.   -Follow blood cultures  -Continue IV Zosyn 4.5 g every 6 hours  -Pharmacy to dose vancomycin  -WOC consult, recommendations appreciated  -Vascular surgery consult 9/28 AM, recommendations for limb preservation appreciated  -Plastic surgery consult 9/20 8 AM, recommendations for limb preservation appreciated  -Repeat blood culture CMP, CRP every 48 hours  -Left 3 view ankle x-ray, left ankle MRI pending those results  -Tylenol as needed for  pain    #Diabetes  Unclear if patient takes any specific diabetic medications at home.  Last visible Hgb A1c on 1/24/2021 is 10.4.  Here today A1c 6.5%.  Given underlying autoimmune disease plan -TSH with reflex T4  -Low-dose sliding scale insulin    #REMBERTO  Prior baseline 3/9/2021, 1.2, no prior history of CKD.  Leading suspicion for intrinsic injury. Though could be combination of pre-intrinsic causes.    -LR@75ml/hr   -FENA  -trend BMP  -consider renal US to eval stenosis    #AUD  Daily alcohol use for several years (1-1.5 pints schnapps) without AV hallucinations, DTs, seizure activity, withdrawing from alcohol.  Given still only 70 hours out since last intake of alcohol, plan for close monitoring.  Patient is not currently interested in addiction medicine consult but will think more on it.  -CIWA scoring program with Valium  -Health psychology discussion  -Magnesium and phosphorus optimization  -Daily folate Theravite and thiamine      #Global muscle atrophy  On evaluation patient appears underweight.  He states he has not had intentional weight loss this year.  He denies any night sweats lymphadenopathy easy bruising or bleeding.  Currently suspect approximately 9 months nonweightbearing status but patient states the majority of the time he is awake in his recliner.  -PT/OT consult, recommendations appreciated  -Prealbumin  -Nutrition services consult caloric density intake    HTN: Patient is not currently on any medications for his blood pressure    # Pain Assessment:  Current Pain Score 9/27/2021   Patient currently in pain? denies   Son s pain level was assessed and he currently denies pain.      Diet: Consistent Carb 75 grams CHO per Meal Diet  Fluids: LR 75ml/hr  DVT Prophylaxis: Pneumatic Compression Devices to rle  Code Status: Full Code    Disposition Plan   Expected discharge: 3-5; recommended to transitional care unit once safe disposition plan/ TCU bed available and further work-up complete, and safe  disposition plan.         Entered: Lindsey Spring 09/28/2021, 1:08 AM   Information in the above section will display in the discharge planner report.    Discussed with and examined by faculty.    This note has been dictated.    Lindsey Spring DO  [facility], PGY-2  [facility]  [facility]   Pager: [phone]  Please see sticky note for cross cover information      Results:     Data   Recent Labs   Lab 09/28/21  0049 09/27/21  1513   WBC  --  7.1   HGB  --  12.9*   MCV  --  93   PLT  --  206   NA  --  135   POTASSIUM  --  3.8   CHLORIDE  --  101   CO2  --  24   BUN  --  21   CR  --  1.75*   ANIONGAP  --  10   ESE  --  8.8   * 111*   ALBUMIN  --  2.4*   PROTTOTAL  --  8.2   BILITOTAL  --  0.8   ALKPHOS  --  142   ALT  --  48   AST  --  98*     Recent Results (from the past 24 hour(s))   XR Ankle Port Left G/E 3 Views    Narrative    EXAM: XR ANKLE PORT LEFT G/E 3 VIEWS  LOCATION: [facility]  DATE/TIME: 9/27/2021 9:01 PM    INDICATION: Left medial ankle wound, status post abscess IND.  COMPARISON: None.      Impression    IMPRESSION: Diffuse osteopenia with diabetic vascular calcifications. The osteopenia is somewhat more focal in the medial malleolus which is where the ulcer is, and this could be the result of osteomyelitis. This is a difficult determination due to   patchy osteomyelitis throughout. MRI may be helpful for confirmation if this would be clinically helpful.                Other Specify

## 2022-03-09 RX ORDER — APIXABAN 5 MG/1
5 TABLET, FILM COATED ORAL
Qty: 60 | Refills: 0 | Status: ACTIVE | COMMUNITY
Start: 2021-02-12 | End: 1900-01-01

## 2022-03-14 ENCOUNTER — APPOINTMENT (OUTPATIENT)
Dept: INTERNAL MEDICINE | Facility: CLINIC | Age: 52
End: 2022-03-14
Payer: COMMERCIAL

## 2022-03-14 VITALS
SYSTOLIC BLOOD PRESSURE: 132 MMHG | BODY MASS INDEX: 31.78 KG/M2 | DIASTOLIC BLOOD PRESSURE: 88 MMHG | WEIGHT: 227 LBS | HEART RATE: 84 BPM | HEIGHT: 71 IN | OXYGEN SATURATION: 96 % | TEMPERATURE: 98.4 F

## 2022-03-14 VITALS — DIASTOLIC BLOOD PRESSURE: 78 MMHG | SYSTOLIC BLOOD PRESSURE: 120 MMHG

## 2022-03-14 DIAGNOSIS — U07.1 COVID-19: ICD-10-CM

## 2022-03-14 DIAGNOSIS — I26.99 OTHER PULMONARY EMBOLISM W/OUT ACUTE COR PULMONALE: ICD-10-CM

## 2022-03-14 PROCEDURE — 90750 HZV VACC RECOMBINANT IM: CPT

## 2022-03-14 PROCEDURE — 36415 COLL VENOUS BLD VENIPUNCTURE: CPT

## 2022-03-14 PROCEDURE — 99214 OFFICE O/P EST MOD 30 MIN: CPT | Mod: 25

## 2022-03-14 PROCEDURE — 90471 IMMUNIZATION ADMIN: CPT

## 2022-03-14 NOTE — HISTORY OF PRESENT ILLNESS
[FreeTextEntry1] : Follow-up for DVT secondary to COVID and type II diabetes [de-identified] : The patient is a 51-year-old male with history of overweight, COVID-19 infection complicated by DVT on anticoagulation and type II diabetes who presents for follow-up patient feels well denies chest pain, shortness of breath palpitation lightheadedness polyuria polydipsia feels well

## 2022-03-14 NOTE — ASSESSMENT
[FreeTextEntry1] : Patient is a 51-year-old male with history of overweight, type II diabetes, COVID infection complicated by DVT PE who presents for follow-up\par \par 1. COVID infection complicated by PE\par check D dimer if normal DC Eliquis\par follow-up in one month\par \par 2. Type II diabetes/prediabetes\par check hemoglobin A1c diet controlled\par \par 3 overweight\par counseled on diet and exercise\par \par 4. Health maintenance\par  CPE in one month\par shingles vaccine\par referral for colonoscopy given

## 2022-03-18 ENCOUNTER — TRANSCRIPTION ENCOUNTER (OUTPATIENT)
Age: 52
End: 2022-03-18

## 2022-03-29 LAB
ANION GAP SERPL CALC-SCNC: 11 MMOL/L
BUN SERPL-MCNC: 13 MG/DL
CALCIUM SERPL-MCNC: 9.4 MG/DL
CHLORIDE SERPL-SCNC: 106 MMOL/L
CO2 SERPL-SCNC: 24 MMOL/L
CREAT SERPL-MCNC: 1.03 MG/DL
EGFR: 88 ML/MIN/1.73M2
ESTIMATED AVERAGE GLUCOSE: 148 MG/DL
GLUCOSE SERPL-MCNC: 121 MG/DL
HBA1C MFR BLD HPLC: 6.8 %
POTASSIUM SERPL-SCNC: 4.4 MMOL/L
SODIUM SERPL-SCNC: 141 MMOL/L

## 2022-04-11 PROBLEM — Z11.59 SCREENING FOR VIRAL DISEASE: Status: RESOLVED | Noted: 2020-07-21 | Resolved: 2021-04-06

## 2022-05-13 LAB
ALBUMIN SERPL ELPH-MCNC: 4.3 G/DL
ALP BLD-CCNC: 84 U/L
ALT SERPL-CCNC: 16 U/L
ANION GAP SERPL CALC-SCNC: 11 MMOL/L
AST SERPL-CCNC: 22 U/L
BASOPHILS # BLD AUTO: 0.05 K/UL
BASOPHILS NFR BLD AUTO: 0.7 %
BILIRUB SERPL-MCNC: 0.3 MG/DL
BUN SERPL-MCNC: 11 MG/DL
CALCIUM SERPL-MCNC: 9.5 MG/DL
CHLORIDE SERPL-SCNC: 105 MMOL/L
CHOLEST SERPL-MCNC: 210 MG/DL
CO2 SERPL-SCNC: 26 MMOL/L
CREAT SERPL-MCNC: 1.03 MG/DL
DEPRECATED D DIMER PPP IA-ACNC: <150 NG/ML DDU
EGFR: 88 ML/MIN/1.73M2
EOSINOPHIL # BLD AUTO: 0.39 K/UL
EOSINOPHIL NFR BLD AUTO: 5.3 %
GLUCOSE SERPL-MCNC: 120 MG/DL
HCT VFR BLD CALC: 43.4 %
HDLC SERPL-MCNC: 49 MG/DL
HGB BLD-MCNC: 13.2 G/DL
IMM GRANULOCYTES NFR BLD AUTO: 0.3 %
LDLC SERPL CALC-MCNC: 137 MG/DL
LYMPHOCYTES # BLD AUTO: 2.64 K/UL
LYMPHOCYTES NFR BLD AUTO: 36 %
MAN DIFF?: NORMAL
MCHC RBC-ENTMCNC: 26.4 PG
MCHC RBC-ENTMCNC: 30.4 GM/DL
MCV RBC AUTO: 86.8 FL
MONOCYTES # BLD AUTO: 0.83 K/UL
MONOCYTES NFR BLD AUTO: 11.3 %
NEUTROPHILS # BLD AUTO: 3.4 K/UL
NEUTROPHILS NFR BLD AUTO: 46.4 %
NONHDLC SERPL-MCNC: 161 MG/DL
PLATELET # BLD AUTO: 244 K/UL
POTASSIUM SERPL-SCNC: 4.4 MMOL/L
PROT SERPL-MCNC: 6.9 G/DL
PSA SERPL-MCNC: 1.12 NG/ML
RBC # BLD: 5 M/UL
RBC # FLD: 13 %
SODIUM SERPL-SCNC: 143 MMOL/L
TRIGL SERPL-MCNC: 122 MG/DL
WBC # FLD AUTO: 7.33 K/UL

## 2022-06-02 ENCOUNTER — APPOINTMENT (OUTPATIENT)
Dept: INTERNAL MEDICINE | Facility: CLINIC | Age: 52
End: 2022-06-02

## 2022-06-06 NOTE — DIETITIAN INITIAL EVALUATION ADULT. - ENTER TO (CAL/KG)
[Excellent] : ~his/her~  mood as  excellent [Never] : Never [No] : No [No falls in past year] : Patient reported no falls in the past year [0] : 2) Feeling down, depressed, or hopeless: Not at all (0) [PHQ-2 Negative - No further assessment needed] : PHQ-2 Negative - No further assessment needed [[id number]] : 0 [Patient reported colonoscopy was normal] : Patient reported colonoscopy was normal [HIV test declined] : HIV test declined [Hepatitis C test declined] : Hepatitis C test declined [Change in mental status noted] : No change in mental status noted [Language] : denies difficulty with language [Behavior] : denies difficulty with behavior [Learning/Retaining New Information] : denies difficulty learning/retaining new information [Handling Complex Tasks] : denies difficulty handling complex tasks [Reasoning] : denies difficulty with reasoning [Spatial Ability and Orientation] : denies difficulty with spatial ability and orientation [None] : None [] :  [Sexually Active] : sexually active [High Risk Behavior] : no high risk behavior [Feels Safe at Home] : Feels safe at home [Fully functional (bathing, dressing, toileting, transferring, walking, feeding)] : Fully functional (bathing, dressing, toileting, transferring, walking, feeding) [Fully functional (using the telephone, shopping, preparing meals, housekeeping, doing laundry, using] : Fully functional and needs no help or supervision to perform IADLs (using the telephone, shopping, preparing meals, housekeeping, doing laundry, using transportation, managing medications and managing finances) [Reports changes in hearing] : Reports no changes in hearing [Reports changes in vision] : Reports no changes in vision [Reports normal functional visual acuity (ie: able to read med bottle)] : Reports poor functional visual acuity.  [Reports changes in dental health] : Reports no changes in dental health [Smoke Detector] : smoke detector [Carbon Monoxide Detector] : carbon monoxide detector [Guns at Home] : no guns at home [Safety elements used in home] : safety elements used in home [Seat Belt] :  uses seat belt [Sunscreen] : does not use sunscreen [Travel to Developing Areas] : does not  travel to developing areas [TB Exposure] : is not being exposed to tuberculosis [Caregiver Concerns] : does not have caregiver concerns [ColonoscopyDate] : 2018 36 [AdvancecareDate] : 6/6/22

## 2022-10-28 NOTE — PROGRESS NOTE ADULT - PROBLEM SELECTOR PLAN 4
Likely ACD, no signs of active/overt bleed    #Leukocytosis.  WBC  5  ->11.89 (2/2). Suspect 2/2 decadron.   -monitor CBC with diff and signs of worsening infx show

## 2023-04-19 NOTE — PROGRESS NOTE ADULT - PROBLEM SELECTOR PLAN 3
April 19, 2023      Annia Vickers  [address] [address] [facility] [address] [facility] [id number]        To Whom It May Concern:    Annia Vickers  was seen on 4/19/2023 and due to health concerns, was unable to bring Summer to school.  Please excuse her  until 4/20/2023.        Sincerely,        [facility]     (Formerly known as [facility])       - c/w standard dexamethasone + remdesivir

## 2024-04-30 ENCOUNTER — EMERGENCY (EMERGENCY)
Facility: HOSPITAL | Age: 54
LOS: 1 days | Discharge: HOME | End: 2024-04-30
Attending: EMERGENCY MEDICINE | Admitting: EMERGENCY MEDICINE
Payer: COMMERCIAL

## 2024-04-30 ENCOUNTER — EMERGENCY (EMERGENCY)
Facility: HOSPITAL | Age: 54
LOS: 1 days | Discharge: HOME | End: 2024-04-30
Attending: EMERGENCY MEDICINE
Payer: COMMERCIAL

## 2024-04-30 VITALS
RESPIRATION RATE: 18 BRPM | HEART RATE: 72 BPM | DIASTOLIC BLOOD PRESSURE: 92 MMHG | WEIGHT: 220.02 LBS | SYSTOLIC BLOOD PRESSURE: 161 MMHG | OXYGEN SATURATION: 99 % | TEMPERATURE: 99 F | HEIGHT: 70 IN

## 2024-04-30 VITALS
HEART RATE: 75 BPM | RESPIRATION RATE: 20 BRPM | SYSTOLIC BLOOD PRESSURE: 158 MMHG | OXYGEN SATURATION: 98 % | TEMPERATURE: 99 F | DIASTOLIC BLOOD PRESSURE: 87 MMHG | WEIGHT: 220.02 LBS | HEIGHT: 70 IN

## 2024-04-30 VITALS
DIASTOLIC BLOOD PRESSURE: 83 MMHG | HEART RATE: 60 BPM | OXYGEN SATURATION: 98 % | SYSTOLIC BLOOD PRESSURE: 133 MMHG | RESPIRATION RATE: 16 BRPM | TEMPERATURE: 98 F

## 2024-04-30 VITALS
DIASTOLIC BLOOD PRESSURE: 82 MMHG | SYSTOLIC BLOOD PRESSURE: 164 MMHG | TEMPERATURE: 98 F | HEART RATE: 76 BPM | OXYGEN SATURATION: 98 % | RESPIRATION RATE: 17 BRPM

## 2024-04-30 LAB
ALBUMIN SERPL ELPH-MCNC: 4.2 G/DL — SIGNIFICANT CHANGE UP (ref 3.3–5)
ALP SERPL-CCNC: 93 U/L — SIGNIFICANT CHANGE UP (ref 40–120)
ALT FLD-CCNC: 19 U/L — SIGNIFICANT CHANGE UP (ref 10–45)
ANION GAP SERPL CALC-SCNC: 14 MMOL/L — SIGNIFICANT CHANGE UP (ref 5–17)
APPEARANCE UR: CLEAR — SIGNIFICANT CHANGE UP
APPEARANCE UR: CLEAR — SIGNIFICANT CHANGE UP
AST SERPL-CCNC: 23 U/L — SIGNIFICANT CHANGE UP (ref 10–40)
BACTERIA # UR AUTO: NEGATIVE /HPF — SIGNIFICANT CHANGE UP
BACTERIA # UR AUTO: NEGATIVE /HPF — SIGNIFICANT CHANGE UP
BASOPHILS # BLD AUTO: 0.06 K/UL — SIGNIFICANT CHANGE UP (ref 0–0.2)
BASOPHILS NFR BLD AUTO: 0.6 % — SIGNIFICANT CHANGE UP (ref 0–2)
BILIRUB SERPL-MCNC: 0.4 MG/DL — SIGNIFICANT CHANGE UP (ref 0.2–1.2)
BILIRUB UR-MCNC: NEGATIVE — SIGNIFICANT CHANGE UP
BILIRUB UR-MCNC: NEGATIVE — SIGNIFICANT CHANGE UP
BUN SERPL-MCNC: 13 MG/DL — SIGNIFICANT CHANGE UP (ref 7–23)
CALCIUM SERPL-MCNC: 9.5 MG/DL — SIGNIFICANT CHANGE UP (ref 8.4–10.5)
CAST: 0 /LPF — SIGNIFICANT CHANGE UP (ref 0–4)
CAST: 1 /LPF — SIGNIFICANT CHANGE UP (ref 0–4)
CHLORIDE SERPL-SCNC: 105 MMOL/L — SIGNIFICANT CHANGE UP (ref 96–108)
CO2 SERPL-SCNC: 20 MMOL/L — LOW (ref 22–31)
COLOR SPEC: YELLOW — SIGNIFICANT CHANGE UP
COLOR SPEC: YELLOW — SIGNIFICANT CHANGE UP
CREAT SERPL-MCNC: 0.91 MG/DL — SIGNIFICANT CHANGE UP (ref 0.5–1.3)
DIFF PNL FLD: ABNORMAL
DIFF PNL FLD: ABNORMAL
EGFR: 101 ML/MIN/1.73M2 — SIGNIFICANT CHANGE UP
EOSINOPHIL # BLD AUTO: 0.36 K/UL — SIGNIFICANT CHANGE UP (ref 0–0.5)
EOSINOPHIL NFR BLD AUTO: 3.7 % — SIGNIFICANT CHANGE UP (ref 0–6)
GLUCOSE SERPL-MCNC: 189 MG/DL — HIGH (ref 70–99)
GLUCOSE UR QL: 100 MG/DL
GLUCOSE UR QL: NEGATIVE MG/DL — SIGNIFICANT CHANGE UP
HCT VFR BLD CALC: 39.7 % — SIGNIFICANT CHANGE UP (ref 39–50)
HGB BLD-MCNC: 12.6 G/DL — LOW (ref 13–17)
IMM GRANULOCYTES NFR BLD AUTO: 0.4 % — SIGNIFICANT CHANGE UP (ref 0–0.9)
KETONES UR-MCNC: ABNORMAL MG/DL
KETONES UR-MCNC: NEGATIVE MG/DL — SIGNIFICANT CHANGE UP
LEUKOCYTE ESTERASE UR-ACNC: NEGATIVE — SIGNIFICANT CHANGE UP
LEUKOCYTE ESTERASE UR-ACNC: NEGATIVE — SIGNIFICANT CHANGE UP
LYMPHOCYTES # BLD AUTO: 1.69 K/UL — SIGNIFICANT CHANGE UP (ref 1–3.3)
LYMPHOCYTES # BLD AUTO: 17.4 % — SIGNIFICANT CHANGE UP (ref 13–44)
MCHC RBC-ENTMCNC: 26.8 PG — LOW (ref 27–34)
MCHC RBC-ENTMCNC: 31.7 GM/DL — LOW (ref 32–36)
MCV RBC AUTO: 84.5 FL — SIGNIFICANT CHANGE UP (ref 80–100)
MONOCYTES # BLD AUTO: 0.82 K/UL — SIGNIFICANT CHANGE UP (ref 0–0.9)
MONOCYTES NFR BLD AUTO: 8.4 % — SIGNIFICANT CHANGE UP (ref 2–14)
NEUTROPHILS # BLD AUTO: 6.75 K/UL — SIGNIFICANT CHANGE UP (ref 1.8–7.4)
NEUTROPHILS NFR BLD AUTO: 69.5 % — SIGNIFICANT CHANGE UP (ref 43–77)
NITRITE UR-MCNC: NEGATIVE — SIGNIFICANT CHANGE UP
NITRITE UR-MCNC: NEGATIVE — SIGNIFICANT CHANGE UP
NRBC # BLD: 0 /100 WBCS — SIGNIFICANT CHANGE UP (ref 0–0)
PH UR: 6.5 — SIGNIFICANT CHANGE UP (ref 5–8)
PH UR: 7 — SIGNIFICANT CHANGE UP (ref 5–8)
PLATELET # BLD AUTO: 230 K/UL — SIGNIFICANT CHANGE UP (ref 150–400)
POTASSIUM SERPL-MCNC: 4.2 MMOL/L — SIGNIFICANT CHANGE UP (ref 3.5–5.3)
POTASSIUM SERPL-SCNC: 4.2 MMOL/L — SIGNIFICANT CHANGE UP (ref 3.5–5.3)
PROT SERPL-MCNC: 7.4 G/DL — SIGNIFICANT CHANGE UP (ref 6–8.3)
PROT UR-MCNC: NEGATIVE MG/DL — SIGNIFICANT CHANGE UP
PROT UR-MCNC: NEGATIVE MG/DL — SIGNIFICANT CHANGE UP
RBC # BLD: 4.7 M/UL — SIGNIFICANT CHANGE UP (ref 4.2–5.8)
RBC # FLD: 12.8 % — SIGNIFICANT CHANGE UP (ref 10.3–14.5)
RBC CASTS # UR COMP ASSIST: 18 /HPF — HIGH (ref 0–4)
RBC CASTS # UR COMP ASSIST: 5 /HPF — HIGH (ref 0–4)
SODIUM SERPL-SCNC: 139 MMOL/L — SIGNIFICANT CHANGE UP (ref 135–145)
SP GR SPEC: 1.02 — SIGNIFICANT CHANGE UP (ref 1–1.03)
SP GR SPEC: >1.03 — HIGH (ref 1–1.03)
SQUAMOUS # UR AUTO: 0 /HPF — SIGNIFICANT CHANGE UP (ref 0–5)
SQUAMOUS # UR AUTO: 0 /HPF — SIGNIFICANT CHANGE UP (ref 0–5)
UROBILINOGEN FLD QL: 0.2 MG/DL — SIGNIFICANT CHANGE UP (ref 0.2–1)
UROBILINOGEN FLD QL: 1 MG/DL — SIGNIFICANT CHANGE UP (ref 0.2–1)
WBC # BLD: 9.72 K/UL — SIGNIFICANT CHANGE UP (ref 3.8–10.5)
WBC # FLD AUTO: 9.72 K/UL — SIGNIFICANT CHANGE UP (ref 3.8–10.5)
WBC UR QL: 1 /HPF — SIGNIFICANT CHANGE UP (ref 0–5)
WBC UR QL: 1 /HPF — SIGNIFICANT CHANGE UP (ref 0–5)

## 2024-04-30 PROCEDURE — 99284 EMERGENCY DEPT VISIT MOD MDM: CPT | Mod: 25

## 2024-04-30 PROCEDURE — 80053 COMPREHEN METABOLIC PANEL: CPT

## 2024-04-30 PROCEDURE — 96374 THER/PROPH/DIAG INJ IV PUSH: CPT | Mod: XU

## 2024-04-30 PROCEDURE — 99284 EMERGENCY DEPT VISIT MOD MDM: CPT

## 2024-04-30 PROCEDURE — 85025 COMPLETE CBC W/AUTO DIFF WBC: CPT

## 2024-04-30 PROCEDURE — 74019 RADEX ABDOMEN 2 VIEWS: CPT | Mod: 26

## 2024-04-30 PROCEDURE — 81001 URINALYSIS AUTO W/SCOPE: CPT

## 2024-04-30 PROCEDURE — 74177 CT ABD & PELVIS W/CONTRAST: CPT | Mod: MC

## 2024-04-30 PROCEDURE — 74019 RADEX ABDOMEN 2 VIEWS: CPT

## 2024-04-30 PROCEDURE — 74177 CT ABD & PELVIS W/CONTRAST: CPT | Mod: 26,MC

## 2024-04-30 PROCEDURE — 99283 EMERGENCY DEPT VISIT LOW MDM: CPT

## 2024-04-30 PROCEDURE — 76770 US EXAM ABDO BACK WALL COMP: CPT

## 2024-04-30 PROCEDURE — 99285 EMERGENCY DEPT VISIT HI MDM: CPT

## 2024-04-30 PROCEDURE — 87086 URINE CULTURE/COLONY COUNT: CPT

## 2024-04-30 PROCEDURE — 96375 TX/PRO/DX INJ NEW DRUG ADDON: CPT

## 2024-04-30 PROCEDURE — 76770 US EXAM ABDO BACK WALL COMP: CPT | Mod: 26

## 2024-04-30 RX ORDER — OXYCODONE HYDROCHLORIDE 5 MG/1
1 TABLET ORAL
Qty: 9 | Refills: 0
Start: 2024-04-30 | End: 2024-05-02

## 2024-04-30 RX ORDER — ACETAMINOPHEN 500 MG
1000 TABLET ORAL ONCE
Refills: 0 | Status: COMPLETED | OUTPATIENT
Start: 2024-04-30 | End: 2024-04-30

## 2024-04-30 RX ORDER — OXYCODONE HYDROCHLORIDE 5 MG/1
5 TABLET ORAL ONCE
Refills: 0 | Status: DISCONTINUED | OUTPATIENT
Start: 2024-04-30 | End: 2024-04-30

## 2024-04-30 RX ORDER — ONDANSETRON 8 MG/1
4 TABLET, FILM COATED ORAL ONCE
Refills: 0 | Status: COMPLETED | OUTPATIENT
Start: 2024-04-30 | End: 2024-04-30

## 2024-04-30 RX ORDER — KETOROLAC TROMETHAMINE 30 MG/ML
15 SYRINGE (ML) INJECTION ONCE
Refills: 0 | Status: DISCONTINUED | OUTPATIENT
Start: 2024-04-30 | End: 2024-04-30

## 2024-04-30 RX ORDER — TAMSULOSIN HYDROCHLORIDE 0.4 MG/1
1 CAPSULE ORAL
Qty: 7 | Refills: 0
Start: 2024-04-30 | End: 2024-05-06

## 2024-04-30 RX ADMIN — Medication 1000 MILLIGRAM(S): at 06:45

## 2024-04-30 RX ADMIN — Medication 15 MILLIGRAM(S): at 05:08

## 2024-04-30 RX ADMIN — Medication 15 MILLIGRAM(S): at 04:17

## 2024-04-30 RX ADMIN — OXYCODONE HYDROCHLORIDE 5 MILLIGRAM(S): 5 TABLET ORAL at 22:11

## 2024-04-30 RX ADMIN — Medication 400 MILLIGRAM(S): at 05:58

## 2024-04-30 RX ADMIN — ONDANSETRON 4 MILLIGRAM(S): 8 TABLET, FILM COATED ORAL at 04:19

## 2024-04-30 NOTE — ED ADULT NURSE NOTE - NSFALLUNIVINTERV_ED_ALL_ED
Bed/Stretcher in lowest position, wheels locked, appropriate side rails in place/Call bell, personal items and telephone in reach/Instruct patient to call for assistance before getting out of bed/chair/stretcher/Non-slip footwear applied when patient is off stretcher/Anton to call system/Physically safe environment - no spills, clutter or unnecessary equipment/Purposeful proactive rounding/Room/bathroom lighting operational, light cord in reach

## 2024-04-30 NOTE — ED PROVIDER NOTE - CLINICAL SUMMARY MEDICAL DECISION MAKING FREE TEXT BOX
Pt concerning for UTI and Nephrolithiasis will order US kidney bladder, UA, UC, and basic labs. Pt likely to be discharged home.

## 2024-04-30 NOTE — ED PROVIDER NOTE - NSFOLLOWUPCLINICS_GEN_ALL_ED_FT
[facility]  [address] [place] [address], [place] [address]  Phone: [phone]  Fax:

## 2024-04-30 NOTE — ED ADULT NURSE REASSESSMENT NOTE - NS ED NURSE REASSESS COMMENT FT1
Received report from Rama Shearer. pt is A&Ox4 able to follow all commands. Pulse, motor, sensation present and equal in all 4 extremities. pt Breathing spontaneous and unlabored on room air, Skin warm and dry and of color appropriate for ethnicity , moves all extremities, speech clear. pt taken to CT. no further nurse intervention needed at this time. Received report from Rama Shearer. pt is A&Ox4 able to follow all commands. Pulse, motor, sensation present and equal in all 4 extremities. pt Breathing spontaneous and unlabored on room air, Skin warm and dry and of color appropriate for ethnicity , moves all extremities, speech clear. pt taken to X-ray, pending CT. no further nurse intervention needed at this time.

## 2024-04-30 NOTE — ED PROVIDER NOTE - ATTENDING APP SHARED VISIT CONTRIBUTION OF CARE
54ym pmh renal colic, No dm,htn,hld,cacner,cva,cad,drug allergy, abd surg, habits. Pt c/o left flank./abd pain onset 3d ago. Tx w advil w mod relief.  Subsequently worsened and was seen in ed during night. 54ym pmh renal colic, No dm,htn,hld,cacner,cva,cad,drug allergy, abd surg, habits. Pt c/o left flank./abd pain onset 3d ago. Tx w advil w mod relief.  Subsequently worsened and was seen in ed during night. Now returns w recurrent  pain. Took tylenol approx 1:45h ago now pain free. No fc/cp/sob/palps/nvdc. PE WDWN male awake alert normocephalic atraumatic neck supple chest clear anterior & posterior cv no rubs, gallops or murmurs abd soft +bs no mass guarding rebound., rash, swelling. Neuro no focal defects  Keith Gomez MD, [facility]

## 2024-04-30 NOTE — ED PROVIDER NOTE - PATIENT PORTAL LINK FT
You can access the FollowMyHealth Patient Portal offered by [facility] by registering at the following website: [email or web address]. By joining [employer]’s FollowMyHealth portal, you will also be able to view your health information using other applications (apps) compatible with our system.
07-Sep-2023

## 2024-04-30 NOTE — ED ADULT NURSE NOTE - OBJECTIVE STATEMENT
54-year-old male recently diagnosed with a kidney stone 5 mm discharged yesterday earlier this morning presenting because the pain medicine is not working.  No fevers no chills no dysuria no frequency. On exam, pt is breathing spontaneously, able to speak full sentences w.o difficulty, saturating 98%RA. ABdomen soft, nontender, nondistended. UA/UC collected and sent.

## 2024-04-30 NOTE — ED PROVIDER NOTE - PHYSICAL EXAMINATION
CONSTITUTIONAL: Well appearing and in no apparent distress. Non toxic appearing.   ENT: Airway patent, moist mucous membranes.   EYES: Pupils equal, round and reactive to light. EOMI. Conjunctiva normal appearing.   CARDIAC: Normal rate, regular rhythm.  Heart sounds S1, S2.    RESPIRATORY: Breath sounds clear and equal bilaterally.   GASTROINTESTINAL: Abdomen soft, non-tender, not distended. No rebound/guarding. No CVAT.   MUSCULOSKELETAL: Spine appears normal.  NEUROLOGICAL: Alert and oriented x3, non focal.

## 2024-04-30 NOTE — ED PROVIDER NOTE - CLINICAL SUMMARY MEDICAL DECISION MAKING FREE TEXT BOX
53 yo M with a PMH of DVT and PE after COVID infection not on AC presents as bounce back for persistent L flank pain. Was seen in ED earlier today had CT AP and was told he had intrarenal calculi, otherwise no acte findings on CT. Went home and has been taking Tylenol and Motrin, alternating throughut the day but pain still persistent. Tolerating PO. Denies nausea, vomiting, fever, chills, dysuria, hematuria, urinary freq/urgency, weakness. Plan for UA/UCx. Given infrarenal calculi consider stones are migrating. No indication for repeat imaging at this time given stable VS, and benign abdomen. Yumi Owen PA-C

## 2024-04-30 NOTE — ED PROVIDER NOTE - RAPID ASSESSMENT
54-year-old male recently diagnosed with a kidney stone 5 mm discharged yesterday earlier this morning presenting because the pain medicine is not working.  No fevers no chills no dysuria no frequency.  Patient was rapidly assessed via a telemedicine and/or role of Quick Triage Doctor; a limited history, physical exam and assessment was performed. The patient will be seen and further evaluated in the main emergency department. The remainder of care and evaluation will be conducted by the primary emergency medicine team. Receiving team will follow up on labs, imaging and serially reassess patient as indicated. All further decisions regarding patient care, evaluation and disposition are at the discretion of the receiving primary emergency department team.

## 2024-04-30 NOTE — ED PROVIDER NOTE - PROGRESS NOTE DETAILS
Pt reassessed, informed that kidney stone may be migrating. Advised to continue Tylenol/Motrin. Oxy sent to pharmacy for breakthrough pain. Advised adequate rest and hydration. Pt getting picked up by his friend. Yumi Owen PA-C

## 2024-04-30 NOTE — ED ADULT TRIAGE NOTE - CHIEF COMPLAINT QUOTE
evaluated here last night for "kidney stones".   Came back to ED due to uncontrolled pain to LLQ pain r/t L flank.  Last took advil/tylenol x 7pm

## 2024-04-30 NOTE — ED PROVIDER NOTE - PROGRESS NOTE DETAILS
Jules Hernandez MD: I have been given all relevant clinical information regarding this patient and will be assuming care from the previous provider. Patient had left flank pain with CT not showing obstructing stone. Patient symptomatically improved. Will discharge with pain medication instructions, flomax, uro referral. Return precautions given.

## 2024-04-30 NOTE — ED PROVIDER NOTE - NSFOLLOWUPINSTRUCTIONS_ED_ALL_ED_FT
Please make sure to follow up with your primary care doctor within 1-2 days.  Return to the ER as discussed if you develop any new or worsening symptoms.     You may take Tylenol 1000mg every 6 hours as needed for pain and/or Ibuprofen 400mg every 6-8 hours as needed for pain. Take Ibuprofen with food. If you have breakthrough pain, you may take oxycodone 5mg WITH the Tylenol and Ibuprofen. Do not drive if you are taking narcotics as they may make you drowsy.     Make sure to rest and adequately hydrate.

## 2024-04-30 NOTE — ED PROVIDER NOTE - OBJECTIVE STATEMENT
54-year-old male PMH history of DVT and PE after COVID infection presenting with left flank pain    Patient states that on Friday he started getting left flank pain pain started as mild and then progressed to severe.  Patient took ibuprofen but was able to fall asleep.  Symptoms progressed and on Saturday began having nausea and vomiting with today having 3 bouts of emesis nonbilious nonbloody.  Patient denies fevers chills chest pain shortness of breath palpitations feelings of weakness.  Patient states flank pain resembles prior episode of kidney stones.  Patient states retained kidney stones for last time he saw his doctor.

## 2024-04-30 NOTE — ED PROVIDER NOTE - ATTENDING CONTRIBUTION TO CARE
Patient with history of 1 episode of kidney stone several years ago, provoked PE while having COVID, no longer on AC, here with left flank pain that started Friday, had been responding to ibuprofen, but tonight did not improve with ibuprofen thus prompting the visit.  Patient feels very nauseated.  He thinks this pain is similar to when he passed the kidney stone years ago.  He denies any dysuria, frequency, diarrhea.  On exam patient looks well without severe distress, without any abdominal tenderness, no CVA tenderness.  While most likely etiology is kidney stone, we will assess for this with ultrasound, UA, labs but will consider alternative pathology if no hydro or stone is seen on ultrasound and may require further imaging.

## 2024-04-30 NOTE — ED PROVIDER NOTE - NSFOLLOWUPINSTRUCTIONS_ED_ALL_ED_FT
You were seen in the emergency department for left flank pain. We have evaluated you and determined that you do not require further hospital interventions.    During your stay you had the following relevant results: a CT scan that did not show infection or obstruction in your intestines    Please follow up with a UROLOGIST to discuss the results of your stay in our department.    You may continue to experience pain after being discharged.  For your pain, you can take 2 tablets (1000 mg) of acetaminophen (brand name Tylenol®) every 6 hours.  If you still have pain, you can also take 2 tablets (400 mg) of ibuprofen (brand names Motrin® or Advil®) every 6 hours.  For better pain control, it is recommended that you alternate these medications every 3 hours.  You should not take more than 4 doses of each medication in a 24-hour period.    You have been sent one or more prescriptions to your pharmacy. The dosing and frequency are included in this paperwork. Please take each medication as instructed.    If you start to experience worsening symptoms such as severe pain, fevers or chills, nausea or vomiting, please return to the emergency department for further evaluation.

## 2024-04-30 NOTE — ED PROVIDER NOTE - PATIENT PORTAL LINK FT
You can access the FollowMyHealth Patient Portal offered by [facility] by registering at the following website: [email or web address]. By joining [email or web address]’s FollowMyHealth portal, you will also be able to view your health information using other applications (apps) compatible with our system.

## 2024-04-30 NOTE — ED PROVIDER NOTE - IV ALTEPLASE EXCL ABS HIDDEN
[FreeTextEntry1] : Follow up [de-identified] : \par 22 year old female presents for PPD placement. 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  show

## 2024-04-30 NOTE — ED PROVIDER NOTE - PHYSICAL EXAMINATION
GENERAL: NAD, lying in bed comfortably  HEAD:  Atraumatic, normocephalic  HEART: Regular rate and rhythm, no murmurs, rubs, or gallops  LUNGS: Unlabored respirations.  Clear to auscultation bilaterally, no crackles, wheezing, or rhonchi  ABDOMEN: Soft, nontender, nondistended, +BS, negative CVA tenderness  EXTREMITIES: 2+ peripheral pulses bilaterally. No clubbing, cyanosis, or edema  NERVOUS SYSTEM:  A&Ox3, moving all extremities, no focal deficits   SKIN: No rashes or lesions

## 2024-05-01 ENCOUNTER — APPOINTMENT (OUTPATIENT)
Dept: UROLOGY | Facility: CLINIC | Age: 54
End: 2024-05-01
Payer: COMMERCIAL

## 2024-05-01 VITALS — HEART RATE: 80 BPM | DIASTOLIC BLOOD PRESSURE: 85 MMHG | OXYGEN SATURATION: 93 % | SYSTOLIC BLOOD PRESSURE: 161 MMHG

## 2024-05-01 LAB
CULTURE RESULTS: SIGNIFICANT CHANGE UP
CULTURE RESULTS: SIGNIFICANT CHANGE UP
SPECIMEN SOURCE: SIGNIFICANT CHANGE UP
SPECIMEN SOURCE: SIGNIFICANT CHANGE UP

## 2024-05-01 PROCEDURE — 99204 OFFICE O/P NEW MOD 45 MIN: CPT

## 2024-05-01 NOTE — ASSESSMENT
[FreeTextEntry1] : 1- check urine for blood 2- dscussed no hydro ? cause of micrhoehamtria  3- recommend BEVERLY and bladder US to see if passed one of 2 renal stones 4- cysto if normal BEVERLY and still + blood 5- discussed repeat PSA after avove setled

## 2024-05-01 NOTE — HISTORY OF PRESENT ILLNESS
[FreeTextEntry1] : went to ER for left sided flank pain  Saturday last week - resolved wiht ibupron  Sunday night meds stopped working and went to ER no fever, Nause and vomiting sent home with toradol , has strainer, was given flomax  also with slow flow , nocturia 0-1 x, feels  empty after void , no intemittency to void  last pain last night with pain med - nothing passed yet admits to dec water and increased take out and restaurant foods here for eval : hx of microhematuria 2016 and passed stone at that time - left side - has at home -    may 2022- psa 1.12 april 30 , 2024 : labs - white count 9.7, ceat 0.91 , urine with + blood and cx negative  CT scan (+) April 30 , 2024: KIDNEYS/URETERS: Bilateral symmetric renal enhancement. Mild left renal lower caliectasis with a 0.5 cm calculus. Additional nonobstructing calculus in the left renal interpole, 0.7 cm.  BLADDER: Bladder wall thickening, nonspecific, which may be secondary to chronic outlet obstruction setting of an enlarged prostate. Intramural fat deposition. REPRODUCTIVE ORGANS: Prostate is enlarged.

## 2024-05-01 NOTE — PHYSICAL EXAM
[Normal Appearance] : normal appearance [Well Groomed] : well groomed [General Appearance - In No Acute Distress] : no acute distress [Edema] : no peripheral edema [Respiration, Rhythm And Depth] : normal respiratory rhythm and effort [Exaggerated Use Of Accessory Muscles For Inspiration] : no accessory muscle use [Abdomen Soft] : soft [Abdomen Tenderness] : non-tender [Costovertebral Angle Tenderness] : no ~M costovertebral angle tenderness [Normal Station and Gait] : the gait and station were normal for the patient's age [] : no rash [No Focal Deficits] : no focal deficits [Oriented To Time, Place, And Person] : oriented to person, place, and time [Affect] : the affect was normal [Mood] : the mood was normal [No Palpable Adenopathy] : no palpable adenopathy [de-identified] : pvr-57 ml

## 2024-05-02 ENCOUNTER — EMERGENCY (EMERGENCY)
Facility: HOSPITAL | Age: 54
LOS: 1 days | Discharge: HOME | End: 2024-05-02
Attending: EMERGENCY MEDICINE
Payer: COMMERCIAL

## 2024-05-02 VITALS
HEIGHT: 70 IN | SYSTOLIC BLOOD PRESSURE: 159 MMHG | TEMPERATURE: 98 F | WEIGHT: 205.03 LBS | RESPIRATION RATE: 20 BRPM | DIASTOLIC BLOOD PRESSURE: 92 MMHG | OXYGEN SATURATION: 99 % | HEART RATE: 71 BPM

## 2024-05-02 VITALS
DIASTOLIC BLOOD PRESSURE: 78 MMHG | OXYGEN SATURATION: 99 % | SYSTOLIC BLOOD PRESSURE: 131 MMHG | RESPIRATION RATE: 18 BRPM | TEMPERATURE: 98 F | HEART RATE: 78 BPM

## 2024-05-02 LAB
APPEARANCE: CLEAR
BACTERIA: NEGATIVE /HPF
BILIRUBIN URINE: NEGATIVE
BLOOD URINE: NEGATIVE
CAST: 0 /LPF
COLOR: YELLOW
EPITHELIAL CELLS: 0 /HPF
GLUCOSE QUALITATIVE U: NEGATIVE MG/DL
KETONES URINE: ABNORMAL MG/DL
LEUKOCYTE ESTERASE URINE: NEGATIVE
MICROSCOPIC-UA: NORMAL
NITRITE URINE: NEGATIVE
PH URINE: 7
PROTEIN URINE: NORMAL MG/DL
RED BLOOD CELLS URINE: 0 /HPF
SPECIFIC GRAVITY URINE: 1.02
UROBILINOGEN URINE: 1 MG/DL
WHITE BLOOD CELLS URINE: 0 /HPF

## 2024-05-02 PROCEDURE — 99283 EMERGENCY DEPT VISIT LOW MDM: CPT

## 2024-05-02 PROCEDURE — 99284 EMERGENCY DEPT VISIT MOD MDM: CPT

## 2024-05-02 RX ORDER — ONDANSETRON 8 MG/1
1 TABLET, FILM COATED ORAL
Qty: 1 | Refills: 0
Start: 2024-05-02

## 2024-05-02 RX ORDER — ONDANSETRON 8 MG/1
4 TABLET, FILM COATED ORAL ONCE
Refills: 0 | Status: COMPLETED | OUTPATIENT
Start: 2024-05-02 | End: 2024-05-02

## 2024-05-02 RX ORDER — IBUPROFEN 200 MG
600 TABLET ORAL ONCE
Refills: 0 | Status: COMPLETED | OUTPATIENT
Start: 2024-05-02 | End: 2024-05-02

## 2024-05-02 RX ADMIN — Medication 600 MILLIGRAM(S): at 10:25

## 2024-05-02 RX ADMIN — ONDANSETRON 4 MILLIGRAM(S): 8 TABLET, FILM COATED ORAL at 10:26

## 2024-05-02 NOTE — ED PROVIDER NOTE - CLINICAL SUMMARY MEDICAL DECISION MAKING FREE TEXT BOX
54 male history of recent diagnosis three days ago of Left 0.5 cm calculus in the left renal lower pole with caliectasis and nonobstructing calculus in the left renal interpole, 0.7 cm, presents with continued left flank pain and one episode of emesis.  Patient states pain was initially controlled yesterday with Tylenol Motrin.  Woke up with 11/10 pain, took Tylenol followed by oxycodone with Tylenol.   Patient states pain has decreased to 4 out of 10.  Did not take Motrin this morning.  Denies fevers, nausea at this time, abdominal pain, hematuria, dysuria.  Afebrile, not tachycardic, no CVA tenderness, no abdominal tenderness.  Likely continued pain from recent diagnosis of nephrolithiasis.  Low concern for septic stone at this time.  Will give patient Motrin, Zofran, reassess.  Patient has outpatient ultrasound tomorrow.

## 2024-05-02 NOTE — ED ADULT NURSE NOTE - LOCATION
12/21/2023         RE: Billy Carroll  [address] [place] [address]        Dear Colleague,    Thank you for referring your patient, Billy Carroll, to the [facility] BLOOD AND MARROW TRANSPLANT PROGRAM [place]. Please see a copy of my visit note below.    [facility]  BMTCT OPEN VISIT    December 21, 2023      Billy Carroll is a 71 year old male undergoing evaluation prior to hematopoietic cell transplant or immune effector cell therapy.    Reason for BMTCT: Multiple Myeloma     Recent chemotherapy: VRD on 12/5/23     Recent infections: No    Blood thinner use? If yes, why? No, low dose ASA     Treatment for diabetes? No    Today, the patient notes the following symptoms:  Review Of Systems  Skin: negative, rash, bruising, lumps or bumps  Eyes: positive glassess. negative, visual blurring, eye pain, photophobia  Ears/Nose/Throat: positive hearing aids and tinnitus. negative, nasal congestion, vertigo, epistaxis, persistent sore throat, bleeding gums  Respiratory: No shortness of breath, dyspnea on exertion, cough, or hemoptysis  Cardiovascular: negative, palpitations, chest pain, dyspnea on exertion, orthopnea, and lower extremity edema  Gastrointestinal: negative, dysphagia, vomiting, heartburn, abdominal pain, hemorrhoids, melena, hematochezia, and diarrhea  Genitourinary: negative, nocturia, frequency, urgency, hesitancy, and hematuria  Musculoskeletal: Positive low back pain, stiff joints. negative and neck pain  Neurologic: positive numbness and tingling to toes. negative, migraine headaches, headaches, syncope, stroke, seizures, and numbness or tingling of hands  Psychiatric: negative, anxiety, and depression  Hematologic/Lymphatic/Immunologic: negative, night sweats, and weight loss  Endocrine: negative, cold intolerance, heat intolerance, hot flashes, and night sweats      Billy Carroll's History    Past Medical History:   Diagnosis Date    Benign essential hypertension      Hyperlipidemia     Multiple myeloma (H)     Followed by Dr. Ruelas with [facility]   - Aortic stenosis s/p TAVR     Past Surgical History:   Procedure Laterality Date    CV CORONARY ANGIOGRAM N/A 9/7/2023    Procedure: Coronary Angiogram;  Surgeon: Domenico Sauceda MD;  Location: [facility] CV    CV TRANSCATHETER AORTIC VALVE REPLACEMENT-FEMORAL APPROACH N/A 10/24/2023    Procedure: Transcatheter Aortic Valve Replacement-Femoral Approach;  Surgeon: Charles Field MD;  Location: [facility] LAB CV    OR TRANSCATHETER AORTIC VALVE REPLACEMENT, FEMORAL PERCUTANEOUS APPROACH (STANDBY) N/A 10/24/2023    Procedure: OR TRANSCATHETER AORTIC VALVE REPLACEMENT, FEMORAL PERCUTANEOUS APPROACH (STANDBY);  Surgeon: Sahra Mosher MD;  Location: [facility] CATH LAB CV       No family history on file.  - Paternal grandfather: diabetes mellitus  - Father: Multiple Myeloma   - Mother: CHF, breast cancer, heart valve replacement   - Sister: Breast cancer   - Brother: healthy   - Brother: healthy   - 3 children no significant medical history     Social History     Tobacco Use    Smoking status: Never     Passive exposure: Never    Smokeless tobacco: Never   Substance Use Topics    Alcohol use: 1-2 day            Billy Carroll's Medications and Allergies    Current Outpatient Medications   Medication    acetaminophen (ACETAMINOPHEN 8 HOUR) 650 MG CR tablet    acyclovir (ZOVIRAX) 400 MG tablet    alendronate (FOSAMAX) 70 MG tablet    aspirin 81 MG EC tablet    Calcium-Magnesium-Zinc 333-133-5 MG TABS per tablet    cholecalciferol 50 MCG (2000 UT) tablet    co-enzyme Q-10 100 MG CAPS capsule    fluticasone (FLONASE) 50 MCG/ACT nasal spray    loratadine (CLARITIN) 10 MG tablet    losartan (COZAAR) 50 MG tablet    metoprolol succinate ER (TOPROL XL) 25 MG 24 hr tablet    mirtazapine (REMERON) 15 MG tablet    nitroGLYcerin (NITROSTAT) 0.4 MG sublingual tablet    Omega-3 Fatty Acids (FISH OIL BURP-LESS) 1000 MG CAPS    psyllium  (METAMUCIL/KONSYL) Packet    rosuvastatin (CRESTOR) 20 MG tablet    sulfamethoxazole-trimethoprim (BACTRIM DS) 800-160 MG tablet    Turmeric 500 MG CAPS    vitamin B complex with vitamin C (VITAMIN  B COMPLEX) tablet    zinc gluconate 50 MG tablet     No current facility-administered medications for this visit.     Facility-Administered Medications Ordered in Other Visits   Medication    fentaNYL (PF) (SUBLIMAZE) injection    lidocaine (viscous) (XYLOCAINE) 2 % solution    lidocaine 1 % injection    lidocaine 4 % injection    midazolam (VERSED) injection    sodium chloride 0.9% infusion          Allergies   Allergen Reactions    Acetaminophen-Codeine Nausea    Codeine Nausea           Physical Examination    BP (!) 159/87 (BP Location: Right arm, Patient Position: Sitting, Cuff Size: Adult Large)   Pulse 79   Temp 98.6  F (37  C) (Oral)   Resp 18   Wt 98.8 kg (217 lb 12.8 oz)   SpO2 96%   BMI 31.25 kg/m      Exam:  Constitutional: healthy, alert, and no distress  Head: Normocephalic. No masses, lesions, tenderness or abnormalities  ENT: ENT exam normal, no neck nodes   Cardiovascular: RRR. No murmurs, clicks gallops or rub  Respiratory: Lungs clear  Gastrointestinal: Abdomen soft, non-tender. BS normal. No masses, organomegaly  : Deferred  Musculoskeletal: extremities normal- no gross deformities noted, gait normal, and normal muscle tone  Skin: no suspicious lesions or rashes  Neurologic: Gait normal. Reflexes normal and symmetric. Sensation grossly WNL.  Psychiatric: mentation appears normal and affect normal/bright  Hematologic/Lymphatic/Immunologic: Normal cervical lymph nodes      Frailty Screening    BMT Fried Frailty          12/21/2023    12:20   Fried Frailty   Lost>10 pounds unintenionally last year N   Exhaustion Score 0   Weakness/ Strength Score 1   Low Activity Level Score 1   Final Score Not Frail   Final Score Number 2   Sit Stand Assessment   Patient able to perform 5 chair stands Y    Chair Stands in seconds 11.58   Patient is able to perform stand with Feet Side by Side? Y   First attemp (in seconds): 10   Patient is able to perform Semi-Tandem Stand? Y   First attemp (in seconds): 10   Patient is able to perform Tandem Stand? Y   First attemp (in seconds): 10       Overall Assessment    I have reviewed the diagnostic data, medications, frailty screening, and general processes prior to BMTCT.  I have notified the Primary BMT Physician/and or Attending Physician in the clinic of any issues. We also discussed in detail the database and biorepository research for which Billy Carroll is eligible. We discussed the potential risks and potential benefits of each of these protocols individually. We explained potential alternatives to the protocols discussed. We explained to the patient that participation is voluntary and that consent may be withdrawn at any time.       Consents Signed:   Blood transfusion consent form  Ethnicity form  [facility]  [facility]    Copies of the signed consent forms will be provided to the patient on admission. No procedures specific to any studies were performed prior to the patient signing the consent form.    Billy Carroll had the opportunity to ask questions, and I answered all of the questions to the best of my ability.      Jimi Zuniga PA-C     flank

## 2024-05-02 NOTE — ED ADULT NURSE NOTE - OBJECTIVE STATEMENT
Patient c/o L flank pain, was recently dx here with kidney stones and came today for pain relief. Patient states he feels better after vomiting today. Last took tylenol at 0830 and oxycodone at 0815 and reports pain relief. Patient denies hematuria, fever, chills, dysuria. Patient A&Ox4, ambulatory. No signs of acute distress at this time. Plan of care in progress.

## 2024-05-02 NOTE — ED PROVIDER NOTE - NSDCPRINTRESULTS_ED_ALL_ED
Discussed with patient. Starting pravastatin 10mg  Daily, check labs 1-2weeks prior to May visit with Dread Morrow MD      Patient requests all Lab, Cardiology, and Radiology Results on their Discharge Instructions

## 2024-05-02 NOTE — ED ADULT TRIAGE NOTE - IDEAL BODY WEIGHT(KG)
73 Siliq Counseling:  I discussed with the patient the risks of Siliq including but not limited to new or worsening depression, suicidal thoughts and behavior, immunosuppression, malignancy, posterior leukoencephalopathy syndrome, and serious infections.  The patient understands that monitoring is required including a PPD at baseline and must alert us or the primary physician if symptoms of infection or other concerning signs are noted. There is also a special program designed to monitor depression which is required with Siliq.

## 2024-05-02 NOTE — ED PROVIDER NOTE - PATIENT PORTAL LINK FT
You can access the FollowMyHealth Patient Portal offered by [facility] by registering at the following website: [email or web address]. By joining [employer]’s FollowMyHealth portal, you will also be able to view your health information using other applications (apps) compatible with our system.

## 2024-05-02 NOTE — ED PROVIDER NOTE - ATTENDING CONTRIBUTION TO CARE
presumptive renal colic as ct did not show ureteral stone , still very uncomfortable w tyl/oxy, has not had motrin  non-tender abdomen  lcvat  oral ibu 600 w substantial improvement and pt requested dc  lengthy dw him regarding how to take pain meds / rx send zofran

## 2024-05-02 NOTE — ED PROVIDER NOTE - NSFOLLOWUPINSTRUCTIONS_ED_ALL_ED_FT
Please follow up with your urologist for your kidney stones.    You can use 500-1000mg Tylenol every 6 hours for pain - as needed.  This is an over-the-counter medications - please respect the warnings on the label. This medication come with certain risks and side effects that you need to discuss with your doctor, especially if you are taking it for a prolonged period.    You can use 400 Ibuprofen (such as motrin or advil) every 6 hours as needed for pain control.  Take ibuprofen with food or milk to lessen stomach upset.  This is an over-the-counter medication please respect the warnings on the label. All medications come with certain risks and side effects that you need to discuss with your doctor, especially if you are taking them for a prolonged period.    Continue the oxycodone as needed for breakthrough pain.    Return to the ED if you develop fevers, unable to tolerate food or liquid, pain is not controlled at home, or new or worsening symptoms.

## 2024-05-02 NOTE — ED PROVIDER NOTE - PHYSICAL EXAMINATION
GEN: NAD. A&Ox3. Non-toxic appearing.  HEENT: normocephalic, atraumatic, EOMI, PERRL, no scleral icterus, no conjuntival pallor, moist MM  CARDIAC: RRR, S1, S2, no murmur. Radial pulses present and symmetric b/l  PULM: CTA B/L no wheeze, rhonchi, rales.   ABD: soft NT, ND, no rebound no guarding, no CVA tenderness.   MSK: Moving all extremities, no edema  NEURO: gait normal, no focal neurological deficits, CN 2-12 grossly intact  SKIN: warm, dry, no rash.

## 2024-05-03 ENCOUNTER — APPOINTMENT (OUTPATIENT)
Dept: ULTRASOUND IMAGING | Facility: CLINIC | Age: 54
End: 2024-05-03
Payer: COMMERCIAL

## 2024-05-03 ENCOUNTER — OUTPATIENT (OUTPATIENT)
Dept: OUTPATIENT SERVICES | Facility: HOSPITAL | Age: 54
LOS: 1 days | End: 2024-05-03
Payer: COMMERCIAL

## 2024-05-03 DIAGNOSIS — N20.0 CALCULUS OF KIDNEY: ICD-10-CM

## 2024-05-03 DIAGNOSIS — N40.0 BENIGN PROSTATIC HYPERPLASIA WITHOUT LOWER URINARY TRACT SYMPTOMS: ICD-10-CM

## 2024-05-03 DIAGNOSIS — Z00.8 ENCOUNTER FOR OTHER GENERAL EXAMINATION: ICD-10-CM

## 2024-05-03 DIAGNOSIS — R31.29 OTHER MICROSCOPIC HEMATURIA: ICD-10-CM

## 2024-05-03 PROCEDURE — 76770 US EXAM ABDO BACK WALL COMP: CPT | Mod: 26

## 2024-05-03 PROCEDURE — 76770 US EXAM ABDO BACK WALL COMP: CPT

## 2024-05-06 ENCOUNTER — NON-APPOINTMENT (OUTPATIENT)
Age: 54
End: 2024-05-06

## 2024-05-07 ENCOUNTER — APPOINTMENT (OUTPATIENT)
Dept: INTERNAL MEDICINE | Facility: CLINIC | Age: 54
End: 2024-05-07
Payer: COMMERCIAL

## 2024-05-07 VITALS
DIASTOLIC BLOOD PRESSURE: 77 MMHG | SYSTOLIC BLOOD PRESSURE: 158 MMHG | TEMPERATURE: 98.7 F | HEIGHT: 71 IN | BODY MASS INDEX: 31.78 KG/M2 | RESPIRATION RATE: 16 BRPM | WEIGHT: 227 LBS | OXYGEN SATURATION: 97 % | HEART RATE: 86 BPM

## 2024-05-07 DIAGNOSIS — E66.3 OVERWEIGHT: ICD-10-CM

## 2024-05-07 DIAGNOSIS — R73.03 PREDIABETES.: ICD-10-CM

## 2024-05-07 LAB
BILIRUB UR QL STRIP: NEGATIVE
GLUCOSE UR-MCNC: NEGATIVE
HCG UR QL: 0.2 EU/DL
HGB UR QL STRIP.AUTO: NEGATIVE
KETONES UR-MCNC: NEGATIVE
LEUKOCYTE ESTERASE UR QL STRIP: NEGATIVE
NITRITE UR QL STRIP: NEGATIVE
PH UR STRIP: 6
PROT UR STRIP-MCNC: NORMAL
SP GR UR STRIP: 1.02

## 2024-05-07 PROCEDURE — 99214 OFFICE O/P EST MOD 30 MIN: CPT

## 2024-05-07 PROCEDURE — 81003 URINALYSIS AUTO W/O SCOPE: CPT | Mod: QW

## 2024-05-07 RX ORDER — OXYCODONE HYDROCHLORIDE 5 MG/1
5 CAPSULE ORAL EVERY 8 HOURS
Qty: 6 | Refills: 0 | Status: ACTIVE | COMMUNITY
Start: 2024-05-07 | End: 1900-01-01

## 2024-05-07 NOTE — ASSESSMENT
[FreeTextEntry1] : Patient is a 54-year-old male with history of overweight, prediabetes kidney stones borderline hypercholesterolemia fatty liver disease, who presents for follow-up of left-sided kidney stones  1.  Left-sided kidney stones  had pain last night presently pain-free Check UA point-of-care showed no blood Hopefully stone has passed into bladder or out Follow-up with urology Will give several more OxyContin'5 disp 5  [id number] no suspicious activities  2.  Overweight Weight down to 219 6 Counseled on diet  3.  Prediabetes No sugar in urine Will check A1c next visit CPE in 2 weeks

## 2024-05-10 ENCOUNTER — RX RENEWAL (OUTPATIENT)
Age: 54
End: 2024-05-10

## 2024-05-10 ENCOUNTER — APPOINTMENT (OUTPATIENT)
Dept: CT IMAGING | Facility: CLINIC | Age: 54
End: 2024-05-10
Payer: COMMERCIAL

## 2024-05-10 ENCOUNTER — OUTPATIENT (OUTPATIENT)
Dept: OUTPATIENT SERVICES | Facility: HOSPITAL | Age: 54
LOS: 1 days | End: 2024-05-10
Payer: COMMERCIAL

## 2024-05-10 DIAGNOSIS — Z00.8 ENCOUNTER FOR OTHER GENERAL EXAMINATION: ICD-10-CM

## 2024-05-10 PROCEDURE — 74176 CT ABD & PELVIS W/O CONTRAST: CPT | Mod: 26

## 2024-05-10 PROCEDURE — 74176 CT ABD & PELVIS W/O CONTRAST: CPT

## 2024-05-10 RX ORDER — TAMSULOSIN HYDROCHLORIDE 0.4 MG/1
0.4 CAPSULE ORAL
Qty: 90 | Refills: 0 | Status: ACTIVE | COMMUNITY
Start: 2024-05-10 | End: 1900-01-01

## 2024-05-15 ENCOUNTER — TRANSCRIPTION ENCOUNTER (OUTPATIENT)
Age: 54
End: 2024-05-15

## 2024-05-20 ENCOUNTER — NON-APPOINTMENT (OUTPATIENT)
Age: 54
End: 2024-05-20

## 2024-05-20 RX ORDER — KETOROLAC TROMETHAMINE 10 MG/1
10 TABLET, FILM COATED ORAL EVERY 6 HOURS
Qty: 20 | Refills: 0 | Status: ACTIVE | COMMUNITY
Start: 2024-05-20 | End: 1900-01-01

## 2024-05-24 ENCOUNTER — APPOINTMENT (OUTPATIENT)
Dept: INTERNAL MEDICINE | Facility: CLINIC | Age: 54
End: 2024-05-24

## 2024-06-24 ENCOUNTER — APPOINTMENT (OUTPATIENT)
Dept: ULTRASOUND IMAGING | Facility: CLINIC | Age: 54
End: 2024-06-24
Payer: COMMERCIAL

## 2024-06-24 ENCOUNTER — APPOINTMENT (OUTPATIENT)
Dept: UROLOGY | Facility: CLINIC | Age: 54
End: 2024-06-24
Payer: COMMERCIAL

## 2024-06-24 VITALS
TEMPERATURE: 97.8 F | HEART RATE: 74 BPM | OXYGEN SATURATION: 98 % | SYSTOLIC BLOOD PRESSURE: 160 MMHG | DIASTOLIC BLOOD PRESSURE: 89 MMHG

## 2024-06-24 DIAGNOSIS — N13.30 UNSPECIFIED HYDRONEPHROSIS: ICD-10-CM

## 2024-06-24 DIAGNOSIS — N20.1 CALCULUS OF URETER: ICD-10-CM

## 2024-06-24 DIAGNOSIS — N40.0 BENIGN PROSTATIC HYPERPLASIA WITHOUT LOWER URINARY TRACT SYMPMS: ICD-10-CM

## 2024-06-24 DIAGNOSIS — N20.0 CALCULUS OF KIDNEY: ICD-10-CM

## 2024-06-24 DIAGNOSIS — R31.29 OTHER MICROSCOPIC HEMATURIA: ICD-10-CM

## 2024-06-24 PROCEDURE — 99213 OFFICE O/P EST LOW 20 MIN: CPT

## 2024-06-24 PROCEDURE — 76770 US EXAM ABDO BACK WALL COMP: CPT

## 2024-08-07 ENCOUNTER — RX RENEWAL (OUTPATIENT)
Age: 54
End: 2024-08-07

## 2024-10-24 NOTE — ED PROVIDER NOTE - MDM ORDERS SUBMITTED SELECTION
Vascular Clinic Appointment Request    Imaging needed? Yes. Orders placed.     Visit type: In-person    Provider: Dr. Rg    Timeframe: Next available    Appt notes: Establish vascular care s/p previous thoracic aortic repair and aortobifem bypass done at [facility]    New or return?: New vascular slot    Documentation routed to clinic coordinators for scheduling.    FRANCY Arreaga, RN  RN Care Coordinator  [facility] Vascular Surgery - [facility] phone: [phone]  Fax: [phone]             Medications

## 2024-12-09 ENCOUNTER — APPOINTMENT (OUTPATIENT)
Dept: INTERNAL MEDICINE | Facility: CLINIC | Age: 54
End: 2024-12-09
Payer: COMMERCIAL

## 2024-12-09 VITALS
RESPIRATION RATE: 16 BRPM | BODY MASS INDEX: 30.66 KG/M2 | SYSTOLIC BLOOD PRESSURE: 135 MMHG | TEMPERATURE: 97.3 F | OXYGEN SATURATION: 98 % | HEIGHT: 71 IN | DIASTOLIC BLOOD PRESSURE: 90 MMHG | WEIGHT: 219 LBS | HEART RATE: 97 BPM

## 2024-12-09 VITALS — DIASTOLIC BLOOD PRESSURE: 78 MMHG | SYSTOLIC BLOOD PRESSURE: 126 MMHG

## 2024-12-09 DIAGNOSIS — R73.03 PREDIABETES.: ICD-10-CM

## 2024-12-09 DIAGNOSIS — E11.9 TYPE 2 DIABETES MELLITUS W/OUT COMPLICATIONS: ICD-10-CM

## 2024-12-09 DIAGNOSIS — E66.3 OVERWEIGHT: ICD-10-CM

## 2024-12-09 DIAGNOSIS — Z01.818 ENCOUNTER FOR OTHER PREPROCEDURAL EXAMINATION: ICD-10-CM

## 2024-12-09 PROCEDURE — 93000 ELECTROCARDIOGRAM COMPLETE: CPT

## 2024-12-09 PROCEDURE — 99214 OFFICE O/P EST MOD 30 MIN: CPT

## 2024-12-09 PROCEDURE — 36415 COLL VENOUS BLD VENIPUNCTURE: CPT

## 2024-12-10 LAB
ALBUMIN SERPL ELPH-MCNC: 4.3 G/DL
ALP BLD-CCNC: 94 U/L
ALT SERPL-CCNC: 23 U/L
ANION GAP SERPL CALC-SCNC: 10 MMOL/L
APPEARANCE: CLEAR
AST SERPL-CCNC: 22 U/L
BILIRUB SERPL-MCNC: 0.4 MG/DL
BILIRUBIN URINE: NEGATIVE
BLOOD URINE: NEGATIVE
BUN SERPL-MCNC: 16 MG/DL
CALCIUM SERPL-MCNC: 9.5 MG/DL
CHLORIDE SERPL-SCNC: 109 MMOL/L
CHOLEST SERPL-MCNC: 226 MG/DL
CO2 SERPL-SCNC: 24 MMOL/L
COLOR: YELLOW
CREAT SERPL-MCNC: 1.7 MG/DL
EGFR: 48 ML/MIN/1.73M2
ESTIMATED AVERAGE GLUCOSE: 154 MG/DL
GLUCOSE QUALITATIVE U: NEGATIVE MG/DL
GLUCOSE SERPL-MCNC: 107 MG/DL
HBA1C MFR BLD HPLC: 7 %
HCT VFR BLD CALC: 44.7 %
HDLC SERPL-MCNC: 39 MG/DL
HGB BLD-MCNC: 13.7 G/DL
KETONES URINE: NEGATIVE MG/DL
LDLC SERPL CALC-MCNC: 172 MG/DL
LEUKOCYTE ESTERASE URINE: NEGATIVE
MCHC RBC-ENTMCNC: 27.6 PG
MCHC RBC-ENTMCNC: 30.6 G/DL
MCV RBC AUTO: 89.9 FL
NITRITE URINE: NEGATIVE
NONHDLC SERPL-MCNC: 187 MG/DL
PH URINE: 6
PLATELET # BLD AUTO: 242 K/UL
POTASSIUM SERPL-SCNC: 4.8 MMOL/L
PROT SERPL-MCNC: 7.3 G/DL
PROTEIN URINE: NORMAL MG/DL
PSA SERPL-MCNC: 1.31 NG/ML
RBC # BLD: 4.97 M/UL
RBC # FLD: 13.3 %
SODIUM SERPL-SCNC: 143 MMOL/L
SPECIFIC GRAVITY URINE: 1.02
TRIGL SERPL-MCNC: 86 MG/DL
UROBILINOGEN URINE: 0.2 MG/DL
WBC # FLD AUTO: 6.86 K/UL

## 2024-12-27 DIAGNOSIS — R79.89 OTHER SPECIFIED ABNORMAL FINDINGS OF BLOOD CHEMISTRY: ICD-10-CM

## 2024-12-30 LAB
APPEARANCE: CLEAR
BACTERIA: NEGATIVE /HPF
BILIRUBIN URINE: NEGATIVE
BLOOD URINE: NEGATIVE
CAST: 0 /LPF
COLOR: YELLOW
EPITHELIAL CELLS: 0 /HPF
GLUCOSE QUALITATIVE U: NEGATIVE MG/DL
KETONES URINE: NEGATIVE MG/DL
LEUKOCYTE ESTERASE URINE: NEGATIVE
MICROSCOPIC-UA: NORMAL
NITRITE URINE: NEGATIVE
PH URINE: 6.5
PROTEIN URINE: NEGATIVE MG/DL
RED BLOOD CELLS URINE: 0 /HPF
SPECIFIC GRAVITY URINE: 1.02
UROBILINOGEN URINE: 0.2 MG/DL
WHITE BLOOD CELLS URINE: 0 /HPF

## 2024-12-31 LAB
ANION GAP SERPL CALC-SCNC: 13 MMOL/L
BUN SERPL-MCNC: 17 MG/DL
CALCIUM SERPL-MCNC: 9.9 MG/DL
CHLORIDE SERPL-SCNC: 109 MMOL/L
CO2 SERPL-SCNC: 23 MMOL/L
CREAT SERPL-MCNC: 1.59 MG/DL
EGFR: 52 ML/MIN/1.73M2
GLUCOSE SERPL-MCNC: 107 MG/DL
POTASSIUM SERPL-SCNC: 4.5 MMOL/L
SODIUM SERPL-SCNC: 144 MMOL/L

## 2025-01-25 ENCOUNTER — NON-APPOINTMENT (OUTPATIENT)
Age: 55
End: 2025-01-25

## 2025-01-29 DIAGNOSIS — E78.5 HYPERLIPIDEMIA, UNSPECIFIED: ICD-10-CM

## 2025-01-29 RX ORDER — TIRZEPATIDE 2.5 MG/.5ML
2.5 INJECTION, SOLUTION SUBCUTANEOUS
Qty: 4 | Refills: 1 | Status: ACTIVE | COMMUNITY
Start: 2025-01-29 | End: 1900-01-01

## 2025-01-29 RX ORDER — ROSUVASTATIN CALCIUM 10 MG/1
10 TABLET, FILM COATED ORAL
Qty: 30 | Refills: 8 | Status: ACTIVE | COMMUNITY
Start: 2025-01-29 | End: 1900-01-01